# Patient Record
Sex: MALE | Race: WHITE | NOT HISPANIC OR LATINO | Employment: STUDENT | URBAN - METROPOLITAN AREA
[De-identification: names, ages, dates, MRNs, and addresses within clinical notes are randomized per-mention and may not be internally consistent; named-entity substitution may affect disease eponyms.]

---
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
## 2019-01-30 NOTE — PROCEDURES
ROUTINE ELECTROENCEPHALOGRAM REPORT      William Carter  [id number]  2000    DATE OF SERVICE: 11/12/18    REQUESTING PROVIDER: Glenroy Gasca MD    REASON FOR CONSULT: 19yo M with episodes concerning for seizure    METHODOLOGY   Electroencephalographic (EEG) recording is with electrodes placed according to the International 10-20 placement system.  Thirty two (32) channels of digital signal (sampling rate of 512/sec) including T1 and T2 was simultaneously recorded from the scalp and may include  EKG, EMG, and/or eye monitors.  Recording band pass was 0.1 to 512 hz.  Digital video recording of the patient is simultaneously recorded with the EEG.  The patient is instructed report clinical symptoms which may occur during the recording session.  EEG and video recording is stored and archived in digital format. Activation procedures which include photic stimulation, hyperventilation and instructing patients to perform simple task are done in selected patients.    The EEG is displayed on a monitor screen and can be reviewed using different montages.  Computer assisted analysis is employed to detect spike and electrographic seizure activity.   The entire record is submitted for computer analysis.  The entire recording is visually reviewed and the times identified by computer analysis as being spikes or seizures are reviewed again.  Compresses spectral analysis (CSA) is also performed on the activity recorded from each individual channel.  This is displayed as a power display of frequencies from 0 to 30 Hz over time.   The CSA is reviewed looking for asymmetries in power between homologous areas of the scalp and then compared with the original EEG recording.     nextsocial software was also utilized in the review of this study.  This software suite analyzes the EEG recording in multiple domains.  Coherence and rhythmicity is computed to identify EEG sections which may contain organized seizures.  Each channel undergoes  analysis to detect presence of spike and sharp waves which have special and morphological characteristic of epileptic activity.  The routine EEG recording is converted from spacial into frequency domain.  This is then displayed comparing homologous areas to identify areas of significant asymmetry.  Algorithm to identify non-cortically generated artifact is used to separate eye movement, EMG and other artifact from the EEG    EEG FINDINGS  Background activity:   The background rhythm was characterized by (9-10 Hz) activity with a 10 Hz posterior dominant alpha rhythm at 30-70 microvolts.   Symmetry and continuity: The background was continuous and symmetric    Sleep:  Normal sleep transients including sleep spindles, K complexes, vertex waves and POSTS were seen.    Activation procedures:   Hyperventilation was performed with no abnormalities seen - triggered an event, see below  Photic stimulation was performed with no abnormalities seen - triggered an event, see below    Abnormal activity:   No epileptiform discharges, periodic discharges, lateralized rhythmic delta activity or electrographic seizures were seen.    EVENT # 1:  Onset at 09:51:35 during photic stimulation: patient was noted to have back arching with head extended back and generalized arrhythmic shaking. He is able to respond verbally to the EEG tech appropriately and is noted to obey her instructions appropriately as well. The event ends at 09:52:21  EEG: there is associated electrode and EMG artifacts, but no epileptiform correlate is noted.    EVENT # 2:  Onset at 10:16:55 during hyperventilation: event is similar to event # 1 except that the patient has his right arm held up with wrist extended. He is able to interact appropriately with the EEG tech during the event that ends at 10:17:35  EEG: there is associated electrode and EMG artifacts, but no epileptiform correlate is noted.    IMPRESSION:   This is a normal routine EEG in awake, drowsy and  asleep states.  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    A normal EEG does not rule out seizures/epilepsy.  CLINICAL CORRELATION IS RECOMMENDED.    Yoselin Andrade MD, CHARLES(), ROSALIND HONEYCUTT.  Neurology-Epilepsy.  [facility].        KARLA/FERMÍN  dd: 01/29/2019 15:04:14 (CST)  td: 01/30/2019 04:42:51 (CST)  Doc ID   #[id number]  Job ID #[id number]    CC:

## 2019-11-30 ENCOUNTER — HOSPITAL ENCOUNTER (EMERGENCY)
Facility: HOSPITAL | Age: 19
Discharge: HOME | End: 2019-12-01
Attending: EMERGENCY MEDICINE
Payer: COMMERCIAL

## 2019-11-30 DIAGNOSIS — R56.9 SEIZURE-LIKE ACTIVITY: Primary | ICD-10-CM

## 2019-11-30 LAB
ALBUMIN SERPL BCP-MCNC: 4.3 G/DL (ref 3.5–5.2)
ALP SERPL-CCNC: 57 U/L (ref 55–135)
ALT SERPL W/O P-5'-P-CCNC: 17 U/L (ref 10–44)
AMPHET+METHAMPHET UR QL: NEGATIVE
ANION GAP SERPL CALC-SCNC: 8 MMOL/L (ref 8–16)
AST SERPL-CCNC: 19 U/L (ref 10–40)
BARBITURATES UR QL SCN>200 NG/ML: NEGATIVE
BASOPHILS # BLD AUTO: 0.04 K/UL (ref 0–0.2)
BASOPHILS NFR BLD: 0.5 % (ref 0–1.9)
BENZODIAZ UR QL SCN>200 NG/ML: NEGATIVE
BILIRUB SERPL-MCNC: 0.3 MG/DL (ref 0.1–1)
BILIRUB UR QL STRIP: NEGATIVE
BUN SERPL-MCNC: 14 MG/DL (ref 6–20)
BZE UR QL SCN: NEGATIVE
CALCIUM SERPL-MCNC: 9.4 MG/DL (ref 8.7–10.5)
CANNABINOIDS UR QL SCN: NEGATIVE
CHLORIDE SERPL-SCNC: 104 MMOL/L (ref 95–110)
CK SERPL-CCNC: 197 U/L (ref 20–200)
CLARITY UR REFRACT.AUTO: CLEAR
CO2 SERPL-SCNC: 28 MMOL/L (ref 23–29)
COLOR UR AUTO: YELLOW
CREAT SERPL-MCNC: 0.9 MG/DL (ref 0.5–1.4)
CREAT UR-MCNC: 88 MG/DL (ref 23–375)
DIFFERENTIAL METHOD: ABNORMAL
EOSINOPHIL # BLD AUTO: 0.1 K/UL (ref 0–0.5)
EOSINOPHIL NFR BLD: 1.4 % (ref 0–8)
ERYTHROCYTE [DISTWIDTH] IN BLOOD BY AUTOMATED COUNT: 11.9 % (ref 11.5–14.5)
EST. GFR  (AFRICAN AMERICAN): >60 ML/MIN/1.73 M^2
EST. GFR  (NON AFRICAN AMERICAN): >60 ML/MIN/1.73 M^2
GLUCOSE SERPL-MCNC: 110 MG/DL (ref 70–110)
GLUCOSE UR QL STRIP: NEGATIVE
HCT VFR BLD AUTO: 42.6 % (ref 40–54)
HGB BLD-MCNC: 14.2 G/DL (ref 14–18)
HGB UR QL STRIP: ABNORMAL
IMM GRANULOCYTES # BLD AUTO: 0.01 K/UL (ref 0–0.04)
IMM GRANULOCYTES NFR BLD AUTO: 0.1 % (ref 0–0.5)
KETONES UR QL STRIP: NEGATIVE
LEUKOCYTE ESTERASE UR QL STRIP: NEGATIVE
LYMPHOCYTES # BLD AUTO: 3.3 K/UL (ref 1–4.8)
LYMPHOCYTES NFR BLD: 41.9 % (ref 18–48)
MCH RBC QN AUTO: 31.3 PG (ref 27–31)
MCHC RBC AUTO-ENTMCNC: 33.3 G/DL (ref 32–36)
MCV RBC AUTO: 94 FL (ref 82–98)
METHADONE UR QL SCN>300 NG/ML: NEGATIVE
MICROSCOPIC COMMENT: NORMAL
MONOCYTES # BLD AUTO: 0.7 K/UL (ref 0.3–1)
MONOCYTES NFR BLD: 9.4 % (ref 4–15)
NEUTROPHILS # BLD AUTO: 3.7 K/UL (ref 1.8–7.7)
NEUTROPHILS NFR BLD: 46.7 % (ref 38–73)
NITRITE UR QL STRIP: NEGATIVE
NRBC BLD-RTO: 0 /100 WBC
OPIATES UR QL SCN: NEGATIVE
PCP UR QL SCN>25 NG/ML: NEGATIVE
PH UR STRIP: 6 [PH] (ref 5–8)
PLATELET # BLD AUTO: 239 K/UL (ref 150–350)
PMV BLD AUTO: 9.4 FL (ref 9.2–12.9)
POTASSIUM SERPL-SCNC: 3.5 MMOL/L (ref 3.5–5.1)
PROT SERPL-MCNC: 7 G/DL (ref 6–8.4)
PROT UR QL STRIP: NEGATIVE
RBC # BLD AUTO: 4.54 M/UL (ref 4.6–6.2)
RBC #/AREA URNS AUTO: 2 /HPF (ref 0–4)
SODIUM SERPL-SCNC: 140 MMOL/L (ref 136–145)
SP GR UR STRIP: 1.01 (ref 1–1.03)
TOXICOLOGY INFORMATION: NORMAL
TSH SERPL DL<=0.005 MIU/L-ACNC: 0.68 UIU/ML (ref 0.4–4)
URN SPEC COLLECT METH UR: ABNORMAL
WBC # BLD AUTO: 7.86 K/UL (ref 3.9–12.7)

## 2019-11-30 PROCEDURE — 80307 DRUG TEST PRSMV CHEM ANLYZR: CPT

## 2019-11-30 PROCEDURE — 99284 EMERGENCY DEPT VISIT MOD MDM: CPT | Mod: 25

## 2019-11-30 PROCEDURE — 96360 HYDRATION IV INFUSION INIT: CPT

## 2019-11-30 PROCEDURE — 99285 EMERGENCY DEPT VISIT HI MDM: CPT | Mod: ,,, | Performed by: PHYSICIAN ASSISTANT

## 2019-11-30 PROCEDURE — 85025 COMPLETE CBC W/AUTO DIFF WBC: CPT

## 2019-11-30 PROCEDURE — 82550 ASSAY OF CK (CPK): CPT

## 2019-11-30 PROCEDURE — 99285 PR EMERGENCY DEPT VISIT,LEVEL V: ICD-10-PCS | Mod: ,,, | Performed by: PHYSICIAN ASSISTANT

## 2019-11-30 PROCEDURE — 81001 URINALYSIS AUTO W/SCOPE: CPT

## 2019-11-30 PROCEDURE — 80053 COMPREHEN METABOLIC PANEL: CPT

## 2019-11-30 PROCEDURE — 63600175 PHARM REV CODE 636 W HCPCS: Performed by: PHYSICIAN ASSISTANT

## 2019-11-30 PROCEDURE — 84443 ASSAY THYROID STIM HORMONE: CPT

## 2019-11-30 RX ORDER — SERTRALINE HYDROCHLORIDE 100 MG/1
100 TABLET, FILM COATED ORAL DAILY
COMMUNITY
End: 2023-04-12

## 2019-11-30 RX ADMIN — SODIUM CHLORIDE 1000 ML: 0.9 INJECTION, SOLUTION INTRAVENOUS at 10:11

## 2019-12-01 VITALS
RESPIRATION RATE: 51 BRPM | BODY MASS INDEX: 25.77 KG/M2 | DIASTOLIC BLOOD PRESSURE: 64 MMHG | HEART RATE: 72 BPM | SYSTOLIC BLOOD PRESSURE: 114 MMHG | WEIGHT: 180 LBS | TEMPERATURE: 98 F | HEIGHT: 70 IN | OXYGEN SATURATION: 99 %

## 2019-12-01 NOTE — ED PROVIDER NOTES
Encounter Date: 11/30/2019       History     Chief Complaint   Patient presents with    Seizures     PT had multiple seizures with family foe 20 minutes, no seizure HX, DX with psuedo-stress seizures, taking lexapro     Patient is a 19 year old male with PMHX of depression. He presents to the ED via EMS for seizure like activity. Family reports patient with seizure like activity occurring innumerable times today. Denies tongue biting or urinary/fecal incontinence. Patient denies increased stress at home and or at training. He denies fever,chills, nausea, vomiting, sob, chest pain, abd pain, dysuria, diarrhea, or constipation. He is a non smoker and denies alcohol use. Denies recreational drug use. 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    The history is provided by the patient, a relative and medical records. No  was used.     Review of patient's allergies indicates:  No Known Allergies  Past Medical History:   Diagnosis Date    Concussion     may 2014    Depression     History of psychiatric hospitalization     Panic attacks      Past Surgical History:   Procedure Laterality Date    ESOPHAGOGASTRODUODENOSCOPY N/A 8/15/2018    Procedure: ESOPHAGOGASTRODUODENOSCOPY (EGD);  Surgeon: Fatmata Lam MD;  Location: [address]);  Service: Endoscopy;  Laterality: N/A;     Family History   Problem Relation Age of Onset    Hypertension Father     Diabetes Father     Depression Maternal Grandmother         situational    Diabetes Paternal Grandmother     Diabetes Paternal Grandfather     Bipolar disorder Mother     Drug abuse Mother      Social History     Tobacco Use    Smoking status: Never Smoker    Smokeless tobacco: Never Used    Tobacco comment: Dad smokes outside   Substance Use Topics    Alcohol use: No    Drug use: No     Review of Systems   Constitutional: Negative for fever.   HENT: Negative for sore throat.    Respiratory: Negative for shortness of breath.     Cardiovascular: Negative for chest pain.   Gastrointestinal: Negative for abdominal pain, nausea and vomiting.   Genitourinary: Negative for dysuria.   Musculoskeletal: Negative for back pain.   Skin: Negative for rash.   Neurological: Positive for seizures. Negative for weakness.   Hematological: Does not bruise/bleed easily.       Physical Exam     Initial Vitals [11/30/19 2206]   BP Pulse Resp Temp SpO2   (!) 144/62 88 18 98.1 °F (36.7 °C) 98 %      MAP       --         Physical Exam    Vitals reviewed.  Constitutional: He appears well-developed and well-nourished. No distress.   HENT:   Head: Normocephalic.   Eyes: Conjunctivae and EOM are normal. Pupils are equal, round, and reactive to light.   Neck: Normal range of motion.   Cardiovascular: Normal rate and regular rhythm.   No murmur heard.  Pulmonary/Chest: Breath sounds normal. No respiratory distress. He has no wheezes. He has no rales.   Abdominal: Soft. Bowel sounds are normal. He exhibits no distension. There is no tenderness.   Musculoskeletal: Normal range of motion.   Neurological: He is alert and oriented to person, place, and time. He has normal strength. No sensory deficit. Coordination normal.   Motor strength of b/l UE and LE 5/5. Finger to nose negative. Heel to shin negative. Pronator drift negative. No facial droop or asymmetry. Speech is clear. Tongue protrudes midline with no fasciculations.   Skin: Skin is warm and dry. No erythema.         ED Course   Procedures  Labs Reviewed   CBC W/ AUTO DIFFERENTIAL - Abnormal; Notable for the following components:       Result Value    RBC 4.54 (*)     Mean Corpuscular Hemoglobin 31.3 (*)     All other components within normal limits   URINALYSIS, REFLEX TO URINE CULTURE - Abnormal; Notable for the following components:    Occult Blood UA 1+ (*)     All other components within normal limits    Narrative:     Preferred Collection Type->Urine, Clean Catch   COMPREHENSIVE METABOLIC PANEL   TSH   CK    DRUG SCREEN PANEL, URINE EMERGENCY    Narrative:     Preferred Collection Type->Urine, Clean Catch   URINALYSIS MICROSCOPIC    Narrative:     Preferred Collection Type->Urine, Clean Catch             Medical Decision Making:   History:   Old Medical Records: I decided to obtain old medical records.  Old Records Summarized: records from clinic visits.       <> Summary of Records: According to records, patient underwent CT head on 09/21/18 found to have no evidence for acute intracranial hemorrhage or new abnormal parenchymal attenuation. Patient underwent EEG monitoring on 11/12/18 found to have normal routine EEG in awake, drowsy and asleep states.  Clinical Tests:   Lab Tests: Ordered and Reviewed       APC / Resident Notes:   Patient is a 19 year old male presents to the ED for emergent evaluation of seizure like activity.     Will order labs. Will continue to monitor.     Differential diagnoses include, but are not limited to: seizure, conversion disorder, anxiety disorder, or electrolyte imbalance.     No leukocytosis. Hemodynamically stable. UA unremarkable for infectious process. UDS unremarkable.     Patient reassessed, reports symptoms improved with ED management. I have discussed emergency department findings, and plan with the patient. Will discharge home with F/U with neurology, psychiatry, and PCP. Patient verbalizes understanding of plan and agrees. Return precautions given.     I have discussed and reviewed with my supervising physician.          Clinical Impression:       ICD-10-CM ICD-9-CM   1. Seizure-like activity R56.9 780.39         Disposition:   Disposition: Discharged  Condition: Stable                     Sujata Palomo PA-C  12/01/19 0718

## 2019-12-01 NOTE — ED TRIAGE NOTES
Per ems, pt family states that he had multiple seizures before ems arrival. Ems states that pt did not have seizure during transport. Pt has Hx of pseudoseizures per ems, no meds given enroute. Pt A&O x4 responding appropriately no signs of seizure activity, pt states that he does not remember the event. Pt denies pain denies injuries.          Patient identifiers verified and correct for William Carter    LOC: The patient is awake, alert and aware of environment with an appropriate affect, the patient is oriented x 3 and speaking appropriately.   APPEARANCE: Patient appears comfortable and in no acute distress, patient is clean and well groomed.  SKIN: The skin is warm and dry, color consistent with ethnicity, patient has normal skin turgor and moist mucus membranes, skin intact, no breakdown or bruising noted.   MUSCULOSKELETAL: Patient moving all extremities spontaneously, no swelling noted.  RESPIRATORY: Airway is open and patent, respirations are spontaneous, patient has a normal effort and rate, no accessory muscle use noted, pt placed on continuous pulse ox with O2 sats noted at 97% on room air.  CARDIAC: Pt placed on cardiac monitor. Patient has a normal rate and regular rhythm, no edema noted, capillary refill < 3 seconds.   GASTRO: Soft and non tender to palpation, no distention noted, normoactive bowel sounds present in all four quadrants. Pt states bowel movements have been regular.  : Pt denies any pain or frequency with urination.  NEURO: Pt opens eyes spontaneously, behavior appropriate to situation, follows commands, facial expression symmetrical, bilateral hand grasp equal and even, purposeful motor response noted, normal sensation in all extremities when touched with a finger.

## 2020-10-05 ENCOUNTER — PATIENT MESSAGE (OUTPATIENT)
Dept: ADMINISTRATIVE | Facility: HOSPITAL | Age: 20
End: 2020-10-05

## 2021-01-04 ENCOUNTER — PATIENT MESSAGE (OUTPATIENT)
Dept: ADMINISTRATIVE | Facility: HOSPITAL | Age: 21
End: 2021-01-04

## 2021-04-05 ENCOUNTER — PATIENT MESSAGE (OUTPATIENT)
Dept: ADMINISTRATIVE | Facility: HOSPITAL | Age: 21
End: 2021-04-05

## 2021-04-06 ENCOUNTER — OFFICE VISIT (OUTPATIENT)
Dept: URGENT CARE | Facility: CLINIC | Age: 21
End: 2021-04-06
Payer: MEDICAID

## 2021-04-06 VITALS
HEART RATE: 85 BPM | TEMPERATURE: 99 F | DIASTOLIC BLOOD PRESSURE: 87 MMHG | BODY MASS INDEX: 25.77 KG/M2 | WEIGHT: 180 LBS | OXYGEN SATURATION: 98 % | RESPIRATION RATE: 16 BRPM | SYSTOLIC BLOOD PRESSURE: 121 MMHG | HEIGHT: 70 IN

## 2021-04-06 DIAGNOSIS — R05.9 COUGH: ICD-10-CM

## 2021-04-06 DIAGNOSIS — J01.00 ACUTE MAXILLARY SINUSITIS, RECURRENCE NOT SPECIFIED: Primary | ICD-10-CM

## 2021-04-06 LAB
CTP QC/QA: YES
SARS-COV-2 RDRP RESP QL NAA+PROBE: NEGATIVE

## 2021-04-06 PROCEDURE — 99214 OFFICE O/P EST MOD 30 MIN: CPT | Mod: S$GLB,,, | Performed by: PHYSICIAN ASSISTANT

## 2021-04-06 PROCEDURE — U0002 COVID-19 LAB TEST NON-CDC: HCPCS | Mod: QW,S$GLB,, | Performed by: PHYSICIAN ASSISTANT

## 2021-04-06 PROCEDURE — U0002: ICD-10-PCS | Mod: QW,S$GLB,, | Performed by: PHYSICIAN ASSISTANT

## 2021-04-06 PROCEDURE — 99214 PR OFFICE/OUTPT VISIT, EST, LEVL IV, 30-39 MIN: ICD-10-PCS | Mod: S$GLB,,, | Performed by: PHYSICIAN ASSISTANT

## 2021-04-06 RX ORDER — FLUTICASONE PROPIONATE 50 MCG
1 SPRAY, SUSPENSION (ML) NASAL DAILY
Qty: 11.1 ML | Refills: 0 | Status: SHIPPED | OUTPATIENT
Start: 2021-04-06 | End: 2022-01-25

## 2021-04-06 RX ORDER — BENZONATATE 200 MG/1
200 CAPSULE ORAL 3 TIMES DAILY PRN
Qty: 30 CAPSULE | Refills: 0 | Status: SHIPPED | OUTPATIENT
Start: 2021-04-06 | End: 2021-04-16

## 2021-07-06 ENCOUNTER — PATIENT MESSAGE (OUTPATIENT)
Dept: ADMINISTRATIVE | Facility: HOSPITAL | Age: 21
End: 2021-07-06

## 2021-08-07 ENCOUNTER — CLINICAL SUPPORT (OUTPATIENT)
Dept: URGENT CARE | Facility: CLINIC | Age: 21
End: 2021-08-07
Payer: MEDICAID

## 2021-08-07 VITALS — TEMPERATURE: 99 F

## 2021-08-07 DIAGNOSIS — Z11.52 ENCOUNTER FOR SCREENING LABORATORY TESTING FOR COVID-19 VIRUS: Primary | ICD-10-CM

## 2021-08-07 LAB
CTP QC/QA: YES
SARS-COV-2 RDRP RESP QL NAA+PROBE: NEGATIVE

## 2021-08-07 PROCEDURE — 87635 SARS-COV-2 COVID-19 AMP PRB: CPT | Mod: QW,S$GLB,, | Performed by: NURSE PRACTITIONER

## 2021-08-07 PROCEDURE — 87635: ICD-10-PCS | Mod: QW,S$GLB,, | Performed by: NURSE PRACTITIONER

## 2023-04-12 ENCOUNTER — OFFICE VISIT (OUTPATIENT)
Dept: INTERNAL MEDICINE | Facility: CLINIC | Age: 23
End: 2023-04-12
Payer: COMMERCIAL

## 2023-04-12 ENCOUNTER — LAB VISIT (OUTPATIENT)
Dept: LAB | Facility: HOSPITAL | Age: 23
End: 2023-04-12
Attending: STUDENT IN AN ORGANIZED HEALTH CARE EDUCATION/TRAINING PROGRAM
Payer: COMMERCIAL

## 2023-04-12 VITALS
WEIGHT: 231.69 LBS | BODY MASS INDEX: 32.44 KG/M2 | DIASTOLIC BLOOD PRESSURE: 80 MMHG | TEMPERATURE: 98 F | HEIGHT: 71 IN | HEART RATE: 88 BPM | SYSTOLIC BLOOD PRESSURE: 120 MMHG | OXYGEN SATURATION: 99 %

## 2023-04-12 DIAGNOSIS — R73.03 PREDIABETES: ICD-10-CM

## 2023-04-12 DIAGNOSIS — Z72.0 VAPES NICOTINE CONTAINING SUBSTANCE: ICD-10-CM

## 2023-04-12 DIAGNOSIS — F41.9 ANXIETY: ICD-10-CM

## 2023-04-12 DIAGNOSIS — Z00.00 ANNUAL PHYSICAL EXAM: Primary | ICD-10-CM

## 2023-04-12 DIAGNOSIS — F32.A DEPRESSION, UNSPECIFIED DEPRESSION TYPE: ICD-10-CM

## 2023-04-12 DIAGNOSIS — E66.09 CLASS 1 OBESITY DUE TO EXCESS CALORIES WITHOUT SERIOUS COMORBIDITY WITH BODY MASS INDEX (BMI) OF 32.0 TO 32.9 IN ADULT: ICD-10-CM

## 2023-04-12 PROCEDURE — 36415 COLL VENOUS BLD VENIPUNCTURE: CPT | Performed by: STUDENT IN AN ORGANIZED HEALTH CARE EDUCATION/TRAINING PROGRAM

## 2023-04-12 PROCEDURE — 99999 PR PBB SHADOW E&M-EST. PATIENT-LVL IV: CPT | Mod: PBBFAC,,, | Performed by: STUDENT IN AN ORGANIZED HEALTH CARE EDUCATION/TRAINING PROGRAM

## 2023-04-12 PROCEDURE — 99395 PR PREVENTIVE VISIT,EST,18-39: ICD-10-PCS | Mod: S$GLB,,, | Performed by: STUDENT IN AN ORGANIZED HEALTH CARE EDUCATION/TRAINING PROGRAM

## 2023-04-12 PROCEDURE — 1160F RVW MEDS BY RX/DR IN RCRD: CPT | Mod: CPTII,S$GLB,, | Performed by: STUDENT IN AN ORGANIZED HEALTH CARE EDUCATION/TRAINING PROGRAM

## 2023-04-12 PROCEDURE — 3008F PR BODY MASS INDEX (BMI) DOCUMENTED: ICD-10-PCS | Mod: CPTII,S$GLB,, | Performed by: STUDENT IN AN ORGANIZED HEALTH CARE EDUCATION/TRAINING PROGRAM

## 2023-04-12 PROCEDURE — 1159F MED LIST DOCD IN RCRD: CPT | Mod: CPTII,S$GLB,, | Performed by: STUDENT IN AN ORGANIZED HEALTH CARE EDUCATION/TRAINING PROGRAM

## 2023-04-12 PROCEDURE — 99999 PR PBB SHADOW E&M-EST. PATIENT-LVL IV: ICD-10-PCS | Mod: PBBFAC,,, | Performed by: STUDENT IN AN ORGANIZED HEALTH CARE EDUCATION/TRAINING PROGRAM

## 2023-04-12 PROCEDURE — 3074F PR MOST RECENT SYSTOLIC BLOOD PRESSURE < 130 MM HG: ICD-10-PCS | Mod: CPTII,S$GLB,, | Performed by: STUDENT IN AN ORGANIZED HEALTH CARE EDUCATION/TRAINING PROGRAM

## 2023-04-12 PROCEDURE — 3008F BODY MASS INDEX DOCD: CPT | Mod: CPTII,S$GLB,, | Performed by: STUDENT IN AN ORGANIZED HEALTH CARE EDUCATION/TRAINING PROGRAM

## 2023-04-12 PROCEDURE — 3079F PR MOST RECENT DIASTOLIC BLOOD PRESSURE 80-89 MM HG: ICD-10-PCS | Mod: CPTII,S$GLB,, | Performed by: STUDENT IN AN ORGANIZED HEALTH CARE EDUCATION/TRAINING PROGRAM

## 2023-04-12 PROCEDURE — 83036 HEMOGLOBIN GLYCOSYLATED A1C: CPT | Performed by: STUDENT IN AN ORGANIZED HEALTH CARE EDUCATION/TRAINING PROGRAM

## 2023-04-12 PROCEDURE — 3079F DIAST BP 80-89 MM HG: CPT | Mod: CPTII,S$GLB,, | Performed by: STUDENT IN AN ORGANIZED HEALTH CARE EDUCATION/TRAINING PROGRAM

## 2023-04-12 PROCEDURE — 1159F PR MEDICATION LIST DOCUMENTED IN MEDICAL RECORD: ICD-10-PCS | Mod: CPTII,S$GLB,, | Performed by: STUDENT IN AN ORGANIZED HEALTH CARE EDUCATION/TRAINING PROGRAM

## 2023-04-12 PROCEDURE — 99395 PREV VISIT EST AGE 18-39: CPT | Mod: S$GLB,,, | Performed by: STUDENT IN AN ORGANIZED HEALTH CARE EDUCATION/TRAINING PROGRAM

## 2023-04-12 PROCEDURE — 3074F SYST BP LT 130 MM HG: CPT | Mod: CPTII,S$GLB,, | Performed by: STUDENT IN AN ORGANIZED HEALTH CARE EDUCATION/TRAINING PROGRAM

## 2023-04-12 PROCEDURE — 1160F PR REVIEW ALL MEDS BY PRESCRIBER/CLIN PHARMACIST DOCUMENTED: ICD-10-PCS | Mod: CPTII,S$GLB,, | Performed by: STUDENT IN AN ORGANIZED HEALTH CARE EDUCATION/TRAINING PROGRAM

## 2023-04-12 NOTE — PROGRESS NOTES
SUBJECTIVE     Chief Complaint   Patient presents with    Annual Exam       HPI  William Carter is a 22 y.o. male with  Anxiety with panic attacks, depression  that presents for annual exam.     PCP is Beth Oates MD    Did health screening at work, reports that blood work was done and was told he has prediabetes. Not sure what the number. Family history of diabetes. Weight today is 231, up from 210 from 1/2023. Reports that he has tried to eat less recently. Avoiding sugar. Mostly eating chicken, some fried chicken due to work and moving. Reports being physical at work, but not outside of work. Planning to walk again, going to gym.     Tobacco: Daily nicotine vape. Never smoked cigarettes.   EtOH: None.   Recreational Drugs: None.   Works at [employer] for [email or web address].   Sexually active with one male partner. Does not engage in penetrative sex. No concerns for STIs.       PAST MEDICAL HISTORY:  Past Medical History:   Diagnosis Date    Concussion     may 2014    Depression     History of psychiatric hospitalization     Panic attacks        PAST SURGICAL HISTORY:  Past Surgical History:   Procedure Laterality Date    ESOPHAGOGASTRODUODENOSCOPY N/A 8/15/2018    Procedure: ESOPHAGOGASTRODUODENOSCOPY (EGD);  Surgeon: Fatmata Lam MD;  Location: [address]);  Service: Endoscopy;  Laterality: N/A;       FAMILY HISTORY:  Family History   Problem Relation Age of Onset    Hypertension Father     Diabetes Father     Depression Maternal Grandmother         situational    Diabetes Paternal Grandmother     Diabetes Paternal Grandfather     Bipolar disorder Mother     Drug abuse Mother        ALLERGIES AND MEDICATIONS: updated and reviewed.  Review of patient's allergies indicates:  No Known Allergies  No current outpatient medications on file.     No current facility-administered medications for this visit.       ROS  Review of Systems   Constitutional:  Negative for activity  "change, chills and fever.   HENT:  Negative for congestion and hearing loss.    Eyes:  Negative for pain and visual disturbance.   Respiratory:  Negative for cough and shortness of breath.    Cardiovascular:  Negative for chest pain and palpitations.   Gastrointestinal:  Negative for abdominal pain, constipation, diarrhea, nausea and vomiting.   Endocrine: Negative.    Genitourinary: Negative.    Musculoskeletal:  Negative for arthralgias and myalgias.   Skin: Negative.    Allergic/Immunologic: Negative.    Neurological:  Negative for dizziness, light-headedness and headaches.   Hematological: Negative.        OBJECTIVE     Physical Exam  Vitals:    04/12/23 1455   BP: 120/80   Pulse: 88   Temp: 98.2 °F (36.8 °C)    Body mass index is 32.32 kg/m².  Weight: 105.1 kg (231 lb 11.3 oz)   Height: 5' 11" (180.3 cm)     Physical Exam  Vitals reviewed.   Constitutional:       General: He is not in acute distress.     Appearance: Normal appearance. He is obese.   HENT:      Head: Normocephalic and atraumatic.      Mouth/Throat:      Mouth: Mucous membranes are moist.      Pharynx: Oropharynx is clear.   Eyes:      Extraocular Movements: Extraocular movements intact.      Conjunctiva/sclera: Conjunctivae normal.      Pupils: Pupils are equal, round, and reactive to light.   Cardiovascular:      Rate and Rhythm: Normal rate and regular rhythm.      Pulses: Normal pulses.      Heart sounds: Normal heart sounds.   Pulmonary:      Effort: Pulmonary effort is normal.      Breath sounds: Normal breath sounds.   Abdominal:      General: Bowel sounds are normal. There is no distension.      Palpations: Abdomen is soft. There is no mass.      Tenderness: There is no abdominal tenderness. There is no guarding.   Musculoskeletal:         General: Normal range of motion.      Cervical back: Normal range of motion and neck supple. No rigidity or tenderness.      Right lower leg: No edema.      Left lower leg: No edema.   Lymphadenopathy: "      Cervical: No cervical adenopathy.   Skin:     General: Skin is warm and dry.   Neurological:      General: No focal deficit present.      Mental Status: He is alert.   Psychiatric:         Mood and Affect: Mood normal.         Behavior: Behavior normal.         Health Maintenance         Date Due Completion Date    Hepatitis C Screening Never done ---    HIV Screening Never done ---    TETANUS VACCINE 09/08/2020 9/8/2010    COVID-19 Vaccine (3 - Booster for Pfizer series) 07/30/2021 6/4/2021    Influenza Vaccine (1) 09/01/2022 10/11/2019              ASSESSMENT     22 y.o. male with     1. Annual physical exam    2. Vapes nicotine containing substance    3. Prediabetes    4. Anxiety    5. Depression, unspecified depression type        PLAN:     1. Annual physical exam  - Discussed age and gender appropriate screenings at this visit and encouraged a healthy diet low in simple carbohydrates, and increased physical activity.  Counseled on medically appropriate vaccines based on age and current health condition.  Screening test reviewed and discussed with patient.   - Declines vaccines today.     2. Vapes nicotine containing substance  - Counseled on risks of vaping, reviewed nicotine replacement therapy. Patient declines at this moment. Will opt to wean self off of vape.     3. Prediabetes  - Reported diagnosis on recent work screen.   - Counseled at length about diet/physical activity changes.   - HEMOGLOBIN A1C; Future    4. Anxiety  5. Depression  - Controlled off of medication. Continue monitoring symptoms.     6. Class 1 obesity due to excess calories without serious comorbidity with body mass index (BMI) of 32.0 to 32.9 in adult.  - Counseled on Mediterranean diet and physical activity above baseline. Given printed information about this.     RTC in 1 year, earlier PRN.      Jordi Piper MD  Family Medicine  [facility]  04/12/2023    This document was created using voice  recognition software (M*Modal Fluency Direct). Although it may be edited, this document may contain errors related to incorrect recognition of the spoken word. Please call the physician if clarification is needed.           No follow-ups on file.

## 2023-04-12 NOTE — PATIENT INSTRUCTIONS
Mediterranean style diet:    Eat:  Olive oil, lean meats such as chicken and fish and only small servings of carbohydrates.   Olive oil and vinegar instead of low fat salad dressings.  Cook food in olive oil.  You can pan lao or saute fish and vegetables instead of boiling or baking.  Unsalted nuts for snacks. Walnuts, cashews, almonds, pecans and pistachios ( not peanuts). Try almond butter or cashew butter on toast or crackers.  Brown bread. You can also dip bread in olive oil and eat it as a snack or appetizer  Seasonal or frozen vegetables and fruits.     Avoid:  Saturated fats and deep fried foods. Also stay away from large servings of starches, sweets, desserts and sugary drinks (both sodas and fruit juices)    Physical Activity Recommendations  Aerobic (or cardio) activity gets your heart rate up and benefits your heart by improving cardiorespiratory fitness. When done at moderate intensity, your heart will beat faster and youll breathe harder than normal, but youll still be able to talk. Think of it as a medium or moderate amount of effort.    It is recommended that you do 30 minutes of moderate-intensity aerobic exercise at least 5 days a week.     Examples of moderate-intensity aerobic activities:    - brisk walking (at least 2.5 miles per hour)  - water aerobics  - dancing (ballroom or social)  - gardening  - tennis (doubles)  - biking slower than 10 miles per hour

## 2023-04-13 LAB
ESTIMATED AVG GLUCOSE: 120 MG/DL (ref 68–131)
HBA1C MFR BLD: 5.8 % (ref 4–5.6)

## 2023-11-14 NOTE — ANESTHESIA PREPROCEDURE EVALUATION
08/15/2018  William Carter is a 18 y.o., male.    Anesthesia Evaluation    I have reviewed the Patient Summary Reports.     I have reviewed the Medications.     Review of Systems  Hematology/Oncology:  Hematology Normal   Oncology Normal     EENT/Dental:EENT/Dental Normal   Cardiovascular:  Cardiovascular Normal     Pulmonary:  Pulmonary Normal    Renal/:  Renal/ Normal     Hepatic/GI:  Hepatic/GI Normal    Musculoskeletal:  Musculoskeletal Normal    Neurological:   Headaches    Endocrine:  Endocrine Normal    Dermatological:  Skin Normal    Psych:   Psychiatric History          Physical Exam  General:  Well nourished    Airway/Jaw/Neck:  Airway Findings: Mouth Opening: Normal Tongue: Normal  General Airway Assessment: Adult  Mallampati: I  Improves to I with phonation.  TM Distance: Normal, at least 6 cm        Eyes/Ears/Nose:  EYES/EARS/NOSE FINDINGS: Normal   Dental:  DENTAL FINDINGS: Normal   Chest/Lungs:  Chest/Lungs Clear    Heart/Vascular:  Heart Findings: Normal Heart murmur: negative Vascular Findings: Normal    Abdomen:  Abdomen Findings: Normal    Musculoskeletal:  Musculoskeletal Findings: Normal   Skin:  Skin Findings: Normal    Mental Status:  Mental Status Findings: Normal        Anesthesia Plan  Type of Anesthesia, risks & benefits discussed:  Anesthesia Type:  general  Patient's Preference: General  Intra-op Monitoring Plan: standard ASA monitors  Intra-op Monitoring Plan Comments:   Post Op Pain Control Plan:   Post Op Pain Control Plan Comments:   Induction:   IV  Beta Blocker:  Patient is not currently on a Beta-Blocker (No further documentation required).       Informed Consent: Patient understands risks and agrees with Anesthesia plan.  Questions answered. Anesthesia consent signed with patient.  ASA Score: 2     Day of Surgery Review of History & Physical:    H&P update referred to  the surgeon.         Ready For Surgery From Anesthesia Perspective.        up prep letter?: n/a

## 2024-01-10 ENCOUNTER — OFFICE VISIT (OUTPATIENT)
Dept: URGENT CARE | Facility: CLINIC | Age: 24
End: 2024-01-10
Payer: COMMERCIAL

## 2024-01-10 VITALS
HEIGHT: 70 IN | HEART RATE: 115 BPM | SYSTOLIC BLOOD PRESSURE: 120 MMHG | BODY MASS INDEX: 31.5 KG/M2 | WEIGHT: 220 LBS | RESPIRATION RATE: 18 BRPM | OXYGEN SATURATION: 97 % | TEMPERATURE: 98 F | DIASTOLIC BLOOD PRESSURE: 86 MMHG

## 2024-01-10 DIAGNOSIS — R50.9 FEVER, UNSPECIFIED FEVER CAUSE: ICD-10-CM

## 2024-01-10 DIAGNOSIS — B34.9 ACUTE VIRAL SYNDROME: Primary | ICD-10-CM

## 2024-01-10 DIAGNOSIS — R68.89 FLU-LIKE SYMPTOMS: ICD-10-CM

## 2024-01-10 LAB
CTP QC/QA: YES
CTP QC/QA: YES
POC MOLECULAR INFLUENZA A AGN: NEGATIVE
POC MOLECULAR INFLUENZA B AGN: NEGATIVE
SARS-COV-2 AG RESP QL IA.RAPID: NEGATIVE

## 2024-01-10 PROCEDURE — 87811 SARS-COV-2 COVID19 W/OPTIC: CPT | Mod: QW,S$GLB,, | Performed by: PHYSICIAN ASSISTANT

## 2024-01-10 PROCEDURE — 87502 INFLUENZA DNA AMP PROBE: CPT | Mod: QW,S$GLB,, | Performed by: PHYSICIAN ASSISTANT

## 2024-01-10 PROCEDURE — 99213 OFFICE O/P EST LOW 20 MIN: CPT | Mod: S$GLB,,, | Performed by: PHYSICIAN ASSISTANT

## 2024-01-10 RX ORDER — BROMPHENIRAMINE MALEATE, PSEUDOEPHEDRINE HYDROCHLORIDE, AND DEXTROMETHORPHAN HYDROBROMIDE 2; 30; 10 MG/5ML; MG/5ML; MG/5ML
10 SYRUP ORAL EVERY 6 HOURS PRN
Qty: 118 ML | Refills: 0 | Status: SHIPPED | OUTPATIENT
Start: 2024-01-10 | End: 2024-01-20

## 2024-01-10 NOTE — LETTER
January 10, 2024      [facility]  [address] [address], [address]  [place] [address]  Phone: [phone]  Fax: [phone]       Patient: William Carter   YOB: 2000  Date of Visit: 01/10/2024    To Whom It May Concern:    April Carter  was at [facility] on 01/10/2024. He may return to work/school on 1/12/2024 with no restrictions. If you have any questions or concerns, or if I can be of further assistance, please do not hesitate to contact me.    Sincerely,    Yuliet Olson PA-C

## 2024-01-11 NOTE — PROGRESS NOTES
"Subjective:      Patient ID: William Carter is a 23 y.o. male.    Vitals:  height is 5' 10" (1.778 m) and weight is 99.8 kg (220 lb). His oral temperature is 97.7 °F (36.5 °C). His blood pressure is 120/86 and his pulse is 115 (abnormal). His respiration is 18 and oxygen saturation is 97%.     Chief Complaint: Fever    Pt states that for about 2-3 days, he has been running a fever, diarrhea, nausea, and body aches. Stated that he has  what he thinks is a sinus drip with no appetite.     Patient provider note starts here:  Patient presents with complaints of flu like symptoms for the past 3 days. Reports nasal congestion, cough, body aches, nausea, diarrhea. He has been taking Dayquil/Nyquil, Motrin and Zofran for the symptoms. Denies chest pain or SOB. Reports one coworker has COVID currently but was not feeling ill when in contact with him.     Fever   This is a new problem. The current episode started in the past 7 days (2-3 days). The problem occurs constantly. The problem has been unchanged. His temperature was unmeasured prior to arrival. Associated symptoms include congestion, diarrhea, headaches, muscle aches and nausea. Pertinent negatives include no abdominal pain, chest pain, coughing, rash, sore throat, vomiting or wheezing. Associated symptoms comments: chills. Treatments tried: ibuprofen, otc cold and flu medication. The treatment provided no relief.       Constitution: Positive for sweating, fatigue and fever. Negative for chills.   HENT:  Positive for congestion and postnasal drip. Negative for sore throat.    Neck: Negative for neck pain and neck stiffness.   Cardiovascular:  Negative for chest pain.   Respiratory:  Negative for chest tightness, cough and wheezing.    Gastrointestinal:  Positive for nausea and diarrhea. Negative for abdominal pain and vomiting.   Musculoskeletal:  Positive for muscle ache. Negative for pain.   Skin:  Negative for rash and wound.   Allergic/Immunologic: Negative for " itching.   Neurological:  Positive for headaches. Negative for numbness and tingling.      Objective:     Physical Exam   Constitutional: He is oriented to person, place, and time. He appears well-developed. He is cooperative.  Non-toxic appearance. He does not appear ill. No distress.   HENT:   Head: Normocephalic and atraumatic.   Ears:   Right Ear: Hearing, tympanic membrane, external ear and ear canal normal.   Left Ear: Hearing, tympanic membrane, external ear and ear canal normal.   Nose: Congestion present. No mucosal edema, rhinorrhea or nasal deformity. No epistaxis. Right sinus exhibits no maxillary sinus tenderness and no frontal sinus tenderness. Left sinus exhibits no maxillary sinus tenderness and no frontal sinus tenderness.   Mouth/Throat: Uvula is midline and mucous membranes are normal. No trismus in the jaw. Normal dentition. No uvula swelling. Posterior oropharyngeal erythema present. No oropharyngeal exudate or posterior oropharyngeal edema.   Eyes: Conjunctivae and lids are normal. No scleral icterus.   Neck: Trachea normal and phonation normal. Neck supple. No edema present. No erythema present. No neck rigidity present.   Cardiovascular: Normal rate, regular rhythm, normal heart sounds and normal pulses.   Pulmonary/Chest: Effort normal and breath sounds normal. No respiratory distress. He has no decreased breath sounds. He has no wheezes. He has no rhonchi.   Abdominal: Normal appearance.   Musculoskeletal: Normal range of motion.         General: No deformity. Normal range of motion.   Neurological: He is alert and oriented to person, place, and time. He exhibits normal muscle tone. Coordination normal.   Skin: Skin is warm, dry, intact, not diaphoretic and not pale.   Psychiatric: His speech is normal and behavior is normal. Judgment and thought content normal.   Nursing note and vitals reviewed.      Assessment:     1. Acute viral syndrome    2. Fever, unspecified fever cause    3.  Flu-like symptoms      Results for orders placed or performed in visit on 01/10/24   POCT Influenza A/B Molecular   Result Value Ref Range    POC Molecular Influenza A Ag Negative Negative, Not Reported    POC Molecular Influenza B Ag Negative Negative, Not Reported     Acceptable Yes    SARS Coronavirus 2 Antigen, POCT Manual Read   Result Value Ref Range    SARS Coronavirus 2 Antigen Negative Negative     Acceptable Yes        Plan:       Acute viral syndrome  -     brompheniramine-pseudoeph-DM (BROMFED DM) 2-30-10 mg/5 mL Syrp; Take 10 mLs by mouth every 6 (six) hours as needed (Cough and congestion).  Dispense: 118 mL; Refill: 0    Fever, unspecified fever cause  -     POCT Influenza A/B Molecular  -     SARS Coronavirus 2 Antigen, POCT Manual Read    Flu-like symptoms          Medical Decision Making:   History:   Old Medical Records: I decided to obtain old medical records.  Clinical Tests:   Lab Tests: Ordered and Reviewed  Urgent Care Management:  A. Problem List:   -Acute: Flu like symptoms, viral syndrome   -Chronic: Anxiety and depression  B. Differential diagnosis: viral vs bacterial URI, pharyngitis, otitis, COVID 19, influenza, pneumonia  C. Diagnostic Testing Ordered: COVID, Flu   D. Diagnostic Testing Considered: None  E. Independent Historians: None  F. Urgent Care Midlevel Independent Results Interpretation: Flu negative, COVID negative  G. Radiology:  H. Review of Previous Medical Records:  I. Home Medications Reviewed  J. Social Determinants of Health considered  K. Medical Decision Making and Disposition: Patient presents with complaints of flu like symptoms for the past 3 days. On exam, he is afebrile ans nontoxic appearing. Lungs CTAB, vitals stable. COVID and Flu both negative. Likely other viral etiology. He was prescribed symptomatic treatment and encouraged close follow-up with PCP. ED precautions discussed, he verbalized understanding and agreed with plan.           Patient Instructions   If not allergic,take tylenol (acetominophen) for fever control, chills, or body aches every 4 hours. Do not exceed 4000 mg/ day.If not allergic, take Motrin (Ibuprofen) every 4 hours for fever, chills, pain or inflammation. Do not exceed 2400 mg/day. You can alternate taking tylenol and motrin.     You must understand that you've received an Urgent Care treatment only and that you may be released before all your medical problems are known or treated. You, the patient, will arrange for follow up care as instructed.      Follow up with your PCP or specialty clinic as instructed in the next 2-3 days if not improved or as needed. You can call [phone] to schedule an appointment with appropriate provider.      If you condition worsens, we recommend that you receive another evaluation at the emergency room immediately or contact your primary medical clinic's after hours call service to discuss your concerns.      Please return here or go to the Emergency Department for any concerns or worsening condition.      If you were prescribed a narcotic or controlled substance, do not drive or operate heavy equipment or machinery while taking these medications.

## 2024-02-07 ENCOUNTER — HOSPITAL ENCOUNTER (EMERGENCY)
Facility: HOSPITAL | Age: 24
Discharge: HOME | End: 2024-02-07
Attending: STUDENT IN AN ORGANIZED HEALTH CARE EDUCATION/TRAINING PROGRAM
Payer: COMMERCIAL

## 2024-02-07 VITALS
HEART RATE: 84 BPM | BODY MASS INDEX: 31.64 KG/M2 | OXYGEN SATURATION: 99 % | TEMPERATURE: 99 F | RESPIRATION RATE: 18 BRPM | WEIGHT: 221 LBS | DIASTOLIC BLOOD PRESSURE: 91 MMHG | SYSTOLIC BLOOD PRESSURE: 131 MMHG | HEIGHT: 70 IN

## 2024-02-07 DIAGNOSIS — I10 HYPERTENSION, UNSPECIFIED TYPE: ICD-10-CM

## 2024-02-07 DIAGNOSIS — R07.9 ACUTE CHEST PAIN: Primary | ICD-10-CM

## 2024-02-07 DIAGNOSIS — M94.0 ACUTE COSTOCHONDRITIS: ICD-10-CM

## 2024-02-07 LAB
ANION GAP SERPL CALC-SCNC: 10 MMOL/L (ref 8–16)
BASOPHILS # BLD AUTO: 0.03 K/UL (ref 0–0.2)
BASOPHILS NFR BLD: 0.5 % (ref 0–1.9)
BNP SERPL-MCNC: <10 PG/ML (ref 0–99)
BUN SERPL-MCNC: 12 MG/DL (ref 6–20)
CALCIUM SERPL-MCNC: 10.1 MG/DL (ref 8.7–10.5)
CHLORIDE SERPL-SCNC: 106 MMOL/L (ref 95–110)
CO2 SERPL-SCNC: 27 MMOL/L (ref 23–29)
CREAT SERPL-MCNC: 1.1 MG/DL (ref 0.5–1.4)
DIFFERENTIAL METHOD BLD: ABNORMAL
EOSINOPHIL # BLD AUTO: 0.1 K/UL (ref 0–0.5)
EOSINOPHIL NFR BLD: 1.6 % (ref 0–8)
ERYTHROCYTE [DISTWIDTH] IN BLOOD BY AUTOMATED COUNT: 12.3 % (ref 11.5–14.5)
EST. GFR  (NO RACE VARIABLE): >60 ML/MIN/1.73 M^2
GLUCOSE SERPL-MCNC: 105 MG/DL (ref 70–110)
HCT VFR BLD AUTO: 49.1 % (ref 40–54)
HGB BLD-MCNC: 16.4 G/DL (ref 14–18)
IMM GRANULOCYTES # BLD AUTO: 0.03 K/UL (ref 0–0.04)
IMM GRANULOCYTES NFR BLD AUTO: 0.5 % (ref 0–0.5)
LYMPHOCYTES # BLD AUTO: 2.3 K/UL (ref 1–4.8)
LYMPHOCYTES NFR BLD: 36.1 % (ref 18–48)
MCH RBC QN AUTO: 31.1 PG (ref 27–31)
MCHC RBC AUTO-ENTMCNC: 33.4 G/DL (ref 32–36)
MCV RBC AUTO: 93 FL (ref 82–98)
MONOCYTES # BLD AUTO: 0.5 K/UL (ref 0.3–1)
MONOCYTES NFR BLD: 8.5 % (ref 4–15)
NEUTROPHILS # BLD AUTO: 3.4 K/UL (ref 1.8–7.7)
NEUTROPHILS NFR BLD: 52.8 % (ref 38–73)
NRBC BLD-RTO: 0 /100 WBC
PLATELET # BLD AUTO: 281 K/UL (ref 150–450)
PMV BLD AUTO: 9.1 FL (ref 9.2–12.9)
POTASSIUM SERPL-SCNC: 4.6 MMOL/L (ref 3.5–5.1)
RBC # BLD AUTO: 5.27 M/UL (ref 4.6–6.2)
SODIUM SERPL-SCNC: 143 MMOL/L (ref 136–145)
TROPONIN I SERPL DL<=0.01 NG/ML-MCNC: <0.006 NG/ML (ref 0–0.03)
WBC # BLD AUTO: 6.37 K/UL (ref 3.9–12.7)

## 2024-02-07 PROCEDURE — 83880 ASSAY OF NATRIURETIC PEPTIDE: CPT | Performed by: STUDENT IN AN ORGANIZED HEALTH CARE EDUCATION/TRAINING PROGRAM

## 2024-02-07 PROCEDURE — 85025 COMPLETE CBC W/AUTO DIFF WBC: CPT | Performed by: STUDENT IN AN ORGANIZED HEALTH CARE EDUCATION/TRAINING PROGRAM

## 2024-02-07 PROCEDURE — 99900031 HC PATIENT EDUCATION (STAT)

## 2024-02-07 PROCEDURE — 84484 ASSAY OF TROPONIN QUANT: CPT | Performed by: STUDENT IN AN ORGANIZED HEALTH CARE EDUCATION/TRAINING PROGRAM

## 2024-02-07 PROCEDURE — 99900035 HC TECH TIME PER 15 MIN (STAT)

## 2024-02-07 PROCEDURE — 93005 ELECTROCARDIOGRAM TRACING: CPT

## 2024-02-07 PROCEDURE — 36415 COLL VENOUS BLD VENIPUNCTURE: CPT | Performed by: STUDENT IN AN ORGANIZED HEALTH CARE EDUCATION/TRAINING PROGRAM

## 2024-02-07 PROCEDURE — 99285 EMERGENCY DEPT VISIT HI MDM: CPT | Mod: 25

## 2024-02-07 PROCEDURE — 80048 BASIC METABOLIC PNL TOTAL CA: CPT | Performed by: STUDENT IN AN ORGANIZED HEALTH CARE EDUCATION/TRAINING PROGRAM

## 2024-02-07 PROCEDURE — 93010 ELECTROCARDIOGRAM REPORT: CPT | Mod: ,,, | Performed by: INTERNAL MEDICINE

## 2024-02-07 PROCEDURE — 25000003 PHARM REV CODE 250: Performed by: STUDENT IN AN ORGANIZED HEALTH CARE EDUCATION/TRAINING PROGRAM

## 2024-02-07 RX ORDER — KETOROLAC TROMETHAMINE 10 MG/1
10 TABLET, FILM COATED ORAL
Status: COMPLETED | OUTPATIENT
Start: 2024-02-07 | End: 2024-02-07

## 2024-02-07 RX ORDER — KETOROLAC TROMETHAMINE 10 MG/1
10 TABLET, FILM COATED ORAL EVERY 6 HOURS PRN
Qty: 30 TABLET | Refills: 0 | Status: SHIPPED | OUTPATIENT
Start: 2024-02-07

## 2024-02-07 RX ADMIN — KETOROLAC TROMETHAMINE 10 MG: 10 TABLET, FILM COATED ORAL at 09:02

## 2024-02-07 NOTE — Clinical Note
"William Padillaer" Raul was seen and treated in our emergency department on 2/7/2024.  He may return to work on 02/09/2024.       If you have any questions or concerns, please don't hesitate to call.      Piter Atwood, DO"

## 2024-02-08 LAB
OHS QRS DURATION: 86 MS
OHS QTC CALCULATION: 407 MS

## 2024-02-08 NOTE — ED PROVIDER NOTES
"Encounter Date: 2/7/2024       History     Chief Complaint   Patient presents with    Chest Pain     Pt to ED with c/o R sided chest pain that began while at work, reports subsided some but still "Dull". Pt's blood pressure elevated at work. Denies cardiac history.      23 year old male with a PMHx of panic attacks, depression presents to the ED with his friend with right-mid chest wall pain. Started 7pm and made him hunch over. Radiates across the lower chest bilaterally. He was at his work (works at the [employer]) and was sent to the doctor on site who noticed his BP is elevated. No hypertension history. No 1st degree relatives with heart disease or sudden cardiac. Vapes occasionally. No regular meds, supplements.         Review of patient's allergies indicates:  No Known Allergies  Past Medical History:   Diagnosis Date    Concussion     may 2014    Depression     History of psychiatric hospitalization     Panic attacks      Past Surgical History:   Procedure Laterality Date    ESOPHAGOGASTRODUODENOSCOPY N/A 8/15/2018    Procedure: ESOPHAGOGASTRODUODENOSCOPY (EGD);  Surgeon: Fatmata Lam MD;  Location: [address];  Service: Endoscopy;  Laterality: N/A;     Family History   Problem Relation Age of Onset    Hypertension Father     Diabetes Father     Depression Maternal Grandmother         situational    Diabetes Paternal Grandmother     Diabetes Paternal Grandfather     Bipolar disorder Mother     Drug abuse Mother      Social History     Tobacco Use    Smoking status: Never    Smokeless tobacco: Never    Tobacco comments:     Dad smokes outside   Substance Use Topics    Alcohol use: No    Drug use: No     Review of Systems   Constitutional:  Negative for chills and fever.   HENT:  Negative for congestion, rhinorrhea and sneezing.    Eyes:  Negative for discharge and redness.   Respiratory:  Positive for shortness of breath. Negative for cough.    Cardiovascular:  Positive for chest pain. Negative for " palpitations.   Gastrointestinal:  Negative for abdominal pain, diarrhea and vomiting.   Genitourinary:  Negative for difficulty urinating, flank pain and urgency.   Musculoskeletal:  Negative for back pain and neck pain.   Skin:  Negative for rash and wound.   Neurological:  Negative for weakness, numbness and headaches.       Physical Exam     Initial Vitals [02/07/24 2011]   BP Pulse Resp Temp SpO2   (!) 141/107 84 18 98.8 °F (37.1 °C) 99 %      MAP       --         Physical Exam    Nursing note and vitals reviewed.  Constitutional: He appears well-developed. He is not diaphoretic. No distress.   HENT:   Head: Normocephalic and atraumatic.   Right Ear: External ear normal.   Left Ear: External ear normal.   Nose: Nose normal.   Eyes: Conjunctivae are normal. Right eye exhibits no discharge. Left eye exhibits no discharge. No scleral icterus.   Cardiovascular:  Normal rate and regular rhythm.           Pulmonary/Chest: Breath sounds normal. No stridor. No respiratory distress. He has no wheezes. He has no rhonchi. He has no rales. He exhibits tenderness.     Abdominal: Abdomen is soft. He exhibits no distension. There is no abdominal tenderness. There is no guarding.   Musculoskeletal:         General: No edema.     Neurological: He is alert and oriented to person, place, and time. GCS score is 15. GCS eye subscore is 4. GCS verbal subscore is 5. GCS motor subscore is 6.   Skin: Skin is warm and dry. Capillary refill takes less than 2 seconds.   Psychiatric: He has a normal mood and affect.         ED Course   Procedures  Labs Reviewed   CBC W/ AUTO DIFFERENTIAL - Abnormal; Notable for the following components:       Result Value    MCH 31.1 (*)     MPV 9.1 (*)     All other components within normal limits   BASIC METABOLIC PANEL   TROPONIN I   B-TYPE NATRIURETIC PEPTIDE     EKG Readings: (Independently Interpreted)   NSR at 77 bpm. Normal axis. Normal intervals. No STEMI. No prior ECG for comparison.        Imaging Results              X-Ray Chest AP Portable (Final result)  Result time 02/07/24 21:12:14      Final result by William Hernandez MD (02/07/24 21:12:14)                   Impression:      No radiographic evidence of acute intrathoracic process on this single view.      Electronically signed by: William Hernandez MD  Date:    02/07/2024  Time:    21:12               Narrative:    EXAMINATION:  XR CHEST AP PORTABLE    CLINICAL HISTORY:  Chest pain, unspecified    TECHNIQUE:  Single frontal view of the chest was performed.    COMPARISON:  None    FINDINGS:  The cardiac silhouette appears within normal limits.  There is mild elevation of the right hemidiaphragm.  No confluent airspace consolidation identified.  No significant volume of pleural fluid or pneumothorax appreciated.  The visualized osseous structures appear intact.                                       Medications   ketorolac tablet 10 mg (has no administration in time range)     Medical Decision Making  Ddx: costochondritis, hypertensive emergency, ACS, cardiac dysrhythmia, PNA, PE    23 year old with HTN, chest wall pain. Suspect costochondritis. Negative ECG, trop, bnp, labs, cxr. Given PO toradol. Bp elevated but downtrending - will need recheck with PCP in case it's still elevated and needs Bp meds. Patient is in agreement.    Amount and/or Complexity of Data Reviewed  Labs: ordered.  Radiology: ordered.    Risk  Prescription drug management.                                      PERC Rule for Pulmonary Embolism    n Age > 50?   n HR > 100?   n O2 sat on room air <95%?   n Unilateral leg swelling?   n Hemoptysis?   n Recent surgery or trauma?  Surgery or trauma < 4 weeks ago requiring treatment with general anesthesia   n Prior PE or DVT?   n Hormone Use?  Oral contraceptives, hormone replacement or estrogenic hormones use in male or female patients   If any criteria are positive, the PERC rule cannot be used to rule out PE in this patient.          Clinical Impression:  Final diagnoses:  [R07.9] Acute chest pain (Primary)  [I10] Hypertension, unspecified type  [M94.0] Acute costochondritis          ED Disposition Condition    Discharge Stable          ED Prescriptions       Medication Sig Dispense Start Date End Date Auth. Provider    ketorolac (TORADOL) 10 mg tablet Take 1 tablet (10 mg total) by mouth every 6 (six) hours as needed for Pain. 30 tablet 2/7/2024 -- Piter Atwood DO          Follow-up Information       Follow up With Specialties Details Why Contact Info    Jordi Piper MD Family Medicine Schedule an appointment as soon as possible for a visit in 1 week  [address]  [place] [address]  [phone]               Piter Atwood DO  02/07/24 8747

## 2024-02-08 NOTE — PROGRESS NOTES
SUBJECTIVE     Chief Complaint   Patient presents with    Anxiety       HPI  Wililam Carter is a 23 y.o. male with  Anxiety, depression, h/o psychiatric hospitalization  that presents for  ED follow up exam .     Seen in the ED on 2/7/24 for acute onset of chest pain that started several hourse before presentation. Work up including EKG, troponin, x-ray were non-revealing. Chest pain was reproducible with palpation on physical exam. Patient given toradol in the ED with improvement of symptoms. Discharged with prescription for PO toradol.     Of note, BP was elevated in the ED. Was 141/107 on presentation, HR 84 bpm. BP downtrended to 121/91 with treatment and upon discharge. No prior history of HTN. Normotensive in clinic today.     Reports that since the ED he has not experienced any chest pain, palpitations, SOB.     Went through a break up in December 2023. Finds himself more irritable and easily angered than before. Reports increase of stress seizure--has been evaluated by Neurology and diagnosed with PNS. Reporting 1-2 times a week when he is relaxing. He is sleeping more than normal -- now sleeping 12 hours a day without refreshing sleep, less energy. Mood has been down. No SI/HI/AVH.   Previously on medication for anxiety. Reporting increased anxiety today.   Medications tried: Fluoxetine, Sertraline, Venlafaxine, Lorazepam, Alprazolam, Clonazepam (reports increased anger on this medication), Trazodone, Concerta.    Requesting screening for STI. Asymptomatic.   No history of STIs.   Patient is MSM. Does not perform oral sex. Engages in anal intercourse, is not the receptive partner.   Consistently uses protection.   Interested in PrEP for HIV.     PAST MEDICAL HISTORY:  Past Medical History:   Diagnosis Date    Concussion     may 2014    Depression     History of psychiatric hospitalization     Panic attacks        PAST SURGICAL HISTORY:  Past Surgical History:   Procedure Laterality Date     ESOPHAGOGASTRODUODENOSCOPY N/A 8/15/2018    Procedure: ESOPHAGOGASTRODUODENOSCOPY (EGD);  Surgeon: Fatmata Lam MD;  Location: [address]);  Service: Endoscopy;  Laterality: N/A;       FAMILY HISTORY:  Family History   Problem Relation Age of Onset    Hypertension Father     Diabetes Father     Depression Maternal Grandmother         situational    Diabetes Paternal Grandmother     Diabetes Paternal Grandfather     Bipolar disorder Mother     Drug abuse Mother        ALLERGIES AND MEDICATIONS: updated and reviewed.  Review of patient's allergies indicates:  No Known Allergies  Current Outpatient Medications   Medication Sig Dispense Refill    ketorolac (TORADOL) 10 mg tablet Take 1 tablet (10 mg total) by mouth every 6 (six) hours as needed for Pain. 30 tablet 0     No current facility-administered medications for this visit.       ROS  Review of Systems   Constitutional:  Negative for activity change, chills and fever.   HENT:  Negative for congestion and hearing loss.    Eyes:  Negative for pain and visual disturbance.   Respiratory:  Negative for cough and shortness of breath.    Cardiovascular:  Negative for chest pain and palpitations.   Gastrointestinal:  Negative for abdominal pain, constipation, diarrhea, nausea and vomiting.   Endocrine: Negative.    Genitourinary: Negative.    Musculoskeletal:  Negative for arthralgias and myalgias.   Skin: Negative.    Allergic/Immunologic: Negative.    Neurological:  Negative for dizziness, light-headedness and headaches.   Hematological: Negative.    Psychiatric/Behavioral:  Positive for decreased concentration and dysphoric mood. Negative for hallucinations, self-injury, sleep disturbance and suicidal ideas. The patient is nervous/anxious. The patient is not hyperactive.          OBJECTIVE     Physical Exam  Vitals:    02/09/24 1454   BP: 125/89   Pulse: 78    Body mass index is 33.06 kg/m².            Physical Exam  Vitals reviewed.   Constitutional:        General: He is not in acute distress.     Appearance: Normal appearance.   HENT:      Head: Normocephalic and atraumatic.      Mouth/Throat:      Mouth: Mucous membranes are moist.      Pharynx: Oropharynx is clear.   Eyes:      Extraocular Movements: Extraocular movements intact.      Conjunctiva/sclera: Conjunctivae normal.      Pupils: Pupils are equal, round, and reactive to light.   Cardiovascular:      Rate and Rhythm: Normal rate and regular rhythm.      Pulses: Normal pulses.      Heart sounds: Normal heart sounds.   Pulmonary:      Effort: Pulmonary effort is normal.      Breath sounds: Normal breath sounds.   Abdominal:      General: There is no distension.   Musculoskeletal:         General: Normal range of motion.      Cervical back: Normal range of motion and neck supple.   Skin:     General: Skin is warm and dry.   Neurological:      General: No focal deficit present.      Mental Status: He is alert.   Psychiatric:         Mood and Affect: Mood and affect normal.           Health Maintenance         Date Due Completion Date    Hepatitis C Screening Never done ---    HIV Screening Never done ---    Influenza Vaccine (1) 09/01/2023 10/11/2019    COVID-19 Vaccine (3 - 2023-24 season) 09/01/2023 6/4/2021    Hemoglobin A1c (Prediabetes) 04/12/2024 4/12/2023    TETANUS VACCINE 03/08/2029 3/8/2019              ASSESSMENT     23 y.o. male with     1. Generalized anxiety disorder    2. Panic disorder    3. Routine screening for STI (sexually transmitted infection)    4. Psychogenic nonepileptic seizure    5. High risk homosexual behavior        PLAN:     1. Generalized anxiety disorder  - Restart SSRI with benzodiazepine bridge.  reviewed.  - Virtual visit in 1 month to assess response, titrate medication as needed.   - FLUoxetine 10 MG capsule; Take 1 capsule (10 mg total) by mouth once daily.  Dispense: 30 capsule; Refill: 11  - LORazepam (ATIVAN) 1 MG tablet; Take 1 tablet (1 mg total) by mouth every 12  (twelve) hours as needed for Anxiety.  Dispense: 60 tablet; Refill: 0    2. Panic disorder  - As per #1.   - FLUoxetine 10 MG capsule; Take 1 capsule (10 mg total) by mouth once daily.  Dispense: 30 capsule; Refill: 11  - LORazepam (ATIVAN) 1 MG tablet; Take 1 tablet (1 mg total) by mouth every 12 (twelve) hours as needed for Anxiety.  Dispense: 60 tablet; Refill: 0    3. Routine screening for STI (sexually transmitted infection)  - Asymptomatic.   - C. trachomatis/N. gonorrhoeae by AMP DNA [facility] [place]; Urine; Future  - Hepatitis Panel, Acute; Future  - HIV 1/2 Ag/Ab (4th Gen); Future  - RPR; Future    4. Psychogenic nonepileptic seizure  - Management of ARCENIO/Panic Disorder as above.   - FLUoxetine 10 MG capsule; Take 1 capsule (10 mg total) by mouth once daily.  Dispense: 30 capsule; Refill: 11  - LORazepam (ATIVAN) 1 MG tablet; Take 1 tablet (1 mg total) by mouth every 12 (twelve) hours as needed for Anxiety.  Dispense: 60 tablet; Refill: 0    5. High risk homosexual behavior  - STI screening as above.   - Discussed PrEP for HIV, patient interested.   - Counseled on safe sex practices.   - HBV, Hep A vaccinations are up to date.         Virtual visit in 1 month       Jordi Piper MD  Family Medicine  [facility]  02/08/2024    This document was created using voice recognition software (M*Modal Fluency Direct). Although it may be edited, this document may contain errors related to incorrect recognition of the spoken word. Please call the physician if clarification is needed.       No follow-ups on file.

## 2024-02-09 ENCOUNTER — LAB VISIT (OUTPATIENT)
Dept: LAB | Facility: HOSPITAL | Age: 24
End: 2024-02-09
Attending: STUDENT IN AN ORGANIZED HEALTH CARE EDUCATION/TRAINING PROGRAM
Payer: COMMERCIAL

## 2024-02-09 ENCOUNTER — OFFICE VISIT (OUTPATIENT)
Dept: INTERNAL MEDICINE | Facility: CLINIC | Age: 24
End: 2024-02-09
Payer: COMMERCIAL

## 2024-02-09 VITALS
BODY MASS INDEX: 33.06 KG/M2 | WEIGHT: 230.38 LBS | OXYGEN SATURATION: 99 % | HEART RATE: 78 BPM | SYSTOLIC BLOOD PRESSURE: 125 MMHG | DIASTOLIC BLOOD PRESSURE: 89 MMHG

## 2024-02-09 DIAGNOSIS — F41.1 GENERALIZED ANXIETY DISORDER: Primary | ICD-10-CM

## 2024-02-09 DIAGNOSIS — F41.0 PANIC DISORDER: ICD-10-CM

## 2024-02-09 DIAGNOSIS — Z72.52 HIGH RISK HOMOSEXUAL BEHAVIOR: ICD-10-CM

## 2024-02-09 DIAGNOSIS — F44.5 PSYCHOGENIC NONEPILEPTIC SEIZURE: ICD-10-CM

## 2024-02-09 DIAGNOSIS — Z11.3 ROUTINE SCREENING FOR STI (SEXUALLY TRANSMITTED INFECTION): ICD-10-CM

## 2024-02-09 PROCEDURE — 3074F SYST BP LT 130 MM HG: CPT | Mod: CPTII,S$GLB,, | Performed by: STUDENT IN AN ORGANIZED HEALTH CARE EDUCATION/TRAINING PROGRAM

## 2024-02-09 PROCEDURE — 1160F RVW MEDS BY RX/DR IN RCRD: CPT | Mod: CPTII,S$GLB,, | Performed by: STUDENT IN AN ORGANIZED HEALTH CARE EDUCATION/TRAINING PROGRAM

## 2024-02-09 PROCEDURE — 99214 OFFICE O/P EST MOD 30 MIN: CPT | Mod: S$GLB,,, | Performed by: STUDENT IN AN ORGANIZED HEALTH CARE EDUCATION/TRAINING PROGRAM

## 2024-02-09 PROCEDURE — 3008F BODY MASS INDEX DOCD: CPT | Mod: CPTII,S$GLB,, | Performed by: STUDENT IN AN ORGANIZED HEALTH CARE EDUCATION/TRAINING PROGRAM

## 2024-02-09 PROCEDURE — 87491 CHLMYD TRACH DNA AMP PROBE: CPT | Performed by: STUDENT IN AN ORGANIZED HEALTH CARE EDUCATION/TRAINING PROGRAM

## 2024-02-09 PROCEDURE — 3079F DIAST BP 80-89 MM HG: CPT | Mod: CPTII,S$GLB,, | Performed by: STUDENT IN AN ORGANIZED HEALTH CARE EDUCATION/TRAINING PROGRAM

## 2024-02-09 PROCEDURE — 1159F MED LIST DOCD IN RCRD: CPT | Mod: CPTII,S$GLB,, | Performed by: STUDENT IN AN ORGANIZED HEALTH CARE EDUCATION/TRAINING PROGRAM

## 2024-02-09 PROCEDURE — 99999 PR PBB SHADOW E&M-EST. PATIENT-LVL III: CPT | Mod: PBBFAC,,, | Performed by: STUDENT IN AN ORGANIZED HEALTH CARE EDUCATION/TRAINING PROGRAM

## 2024-02-09 RX ORDER — LORAZEPAM 1 MG/1
1 TABLET ORAL EVERY 12 HOURS PRN
Qty: 60 TABLET | Refills: 0 | Status: SHIPPED | OUTPATIENT
Start: 2024-02-09 | End: 2024-03-13

## 2024-02-09 RX ORDER — FLUOXETINE 10 MG/1
10 CAPSULE ORAL DAILY
Qty: 30 CAPSULE | Refills: 11 | Status: SHIPPED | OUTPATIENT
Start: 2024-02-09 | End: 2025-02-08

## 2024-02-12 ENCOUNTER — PATIENT MESSAGE (OUTPATIENT)
Dept: INTERNAL MEDICINE | Facility: CLINIC | Age: 24
End: 2024-02-12
Payer: COMMERCIAL

## 2024-02-12 DIAGNOSIS — Z72.52 HIGH RISK HOMOSEXUAL BEHAVIOR: Primary | ICD-10-CM

## 2024-02-12 RX ORDER — EMTRICITABINE AND TENOFOVIR DISOPROXIL FUMARATE 200; 300 MG/1; MG/1
1 TABLET, FILM COATED ORAL DAILY
Qty: 90 TABLET | Refills: 0 | Status: ACTIVE | OUTPATIENT
Start: 2024-02-12 | End: 2024-05-31 | Stop reason: SDUPTHER

## 2024-02-14 PROBLEM — Z79.899 ON PRE-EXPOSURE PROPHYLAXIS FOR HIV: Status: ACTIVE | Noted: 2024-02-14

## 2024-02-15 LAB
C TRACH DNA SPEC QL NAA+PROBE: NOT DETECTED
N GONORRHOEA DNA SPEC QL NAA+PROBE: NOT DETECTED

## 2024-02-20 ENCOUNTER — HOSPITAL ENCOUNTER (EMERGENCY)
Facility: HOSPITAL | Age: 24
Discharge: HOME | End: 2024-02-20
Attending: EMERGENCY MEDICINE
Payer: COMMERCIAL

## 2024-02-20 VITALS
TEMPERATURE: 98 F | RESPIRATION RATE: 16 BRPM | SYSTOLIC BLOOD PRESSURE: 133 MMHG | OXYGEN SATURATION: 95 % | HEART RATE: 89 BPM | DIASTOLIC BLOOD PRESSURE: 94 MMHG | HEIGHT: 70 IN | WEIGHT: 220 LBS | BODY MASS INDEX: 31.5 KG/M2

## 2024-02-20 DIAGNOSIS — F41.9 ANXIETY: ICD-10-CM

## 2024-02-20 DIAGNOSIS — R07.9 CHEST PAIN, UNSPECIFIED TYPE: Primary | ICD-10-CM

## 2024-02-20 DIAGNOSIS — R11.0 NAUSEA: ICD-10-CM

## 2024-02-20 DIAGNOSIS — R07.9 CHEST PAIN: ICD-10-CM

## 2024-02-20 LAB
ALBUMIN SERPL BCP-MCNC: 4.5 G/DL (ref 3.5–5.2)
ALP SERPL-CCNC: 67 U/L (ref 55–135)
ALT SERPL W/O P-5'-P-CCNC: 52 U/L (ref 10–44)
ANION GAP SERPL CALC-SCNC: 11 MMOL/L (ref 8–16)
AST SERPL-CCNC: 28 U/L (ref 10–40)
BASOPHILS # BLD AUTO: 0.04 K/UL (ref 0–0.2)
BASOPHILS NFR BLD: 0.4 % (ref 0–1.9)
BILIRUB SERPL-MCNC: 0.6 MG/DL (ref 0.1–1)
BUN SERPL-MCNC: 13 MG/DL (ref 6–20)
CALCIUM SERPL-MCNC: 9.7 MG/DL (ref 8.7–10.5)
CHLORIDE SERPL-SCNC: 105 MMOL/L (ref 95–110)
CO2 SERPL-SCNC: 23 MMOL/L (ref 23–29)
CREAT SERPL-MCNC: 1.1 MG/DL (ref 0.5–1.4)
DIFFERENTIAL METHOD BLD: ABNORMAL
EOSINOPHIL # BLD AUTO: 0.1 K/UL (ref 0–0.5)
EOSINOPHIL NFR BLD: 0.8 % (ref 0–8)
ERYTHROCYTE [DISTWIDTH] IN BLOOD BY AUTOMATED COUNT: 12.2 % (ref 11.5–14.5)
EST. GFR  (NO RACE VARIABLE): >60 ML/MIN/1.73 M^2
GLUCOSE SERPL-MCNC: 185 MG/DL (ref 70–110)
HCT VFR BLD AUTO: 46 % (ref 40–54)
HGB BLD-MCNC: 15.8 G/DL (ref 14–18)
IMM GRANULOCYTES # BLD AUTO: 0.02 K/UL (ref 0–0.04)
IMM GRANULOCYTES NFR BLD AUTO: 0.2 % (ref 0–0.5)
LYMPHOCYTES # BLD AUTO: 2.8 K/UL (ref 1–4.8)
LYMPHOCYTES NFR BLD: 30.4 % (ref 18–48)
MCH RBC QN AUTO: 31.5 PG (ref 27–31)
MCHC RBC AUTO-ENTMCNC: 34.3 G/DL (ref 32–36)
MCV RBC AUTO: 92 FL (ref 82–98)
MONOCYTES # BLD AUTO: 0.6 K/UL (ref 0.3–1)
MONOCYTES NFR BLD: 6.7 % (ref 4–15)
NEUTROPHILS # BLD AUTO: 5.6 K/UL (ref 1.8–7.7)
NEUTROPHILS NFR BLD: 61.5 % (ref 38–73)
NRBC BLD-RTO: 0 /100 WBC
PLATELET # BLD AUTO: 319 K/UL (ref 150–450)
PMV BLD AUTO: 9.6 FL (ref 9.2–12.9)
POTASSIUM SERPL-SCNC: 3.6 MMOL/L (ref 3.5–5.1)
PROT SERPL-MCNC: 7.5 G/DL (ref 6–8.4)
RBC # BLD AUTO: 5.01 M/UL (ref 4.6–6.2)
SODIUM SERPL-SCNC: 139 MMOL/L (ref 136–145)
TROPONIN I SERPL DL<=0.01 NG/ML-MCNC: <0.006 NG/ML (ref 0–0.03)
TROPONIN I SERPL DL<=0.01 NG/ML-MCNC: <0.006 NG/ML (ref 0–0.03)
WBC # BLD AUTO: 9.04 K/UL (ref 3.9–12.7)

## 2024-02-20 PROCEDURE — 94760 N-INVAS EAR/PLS OXIMETRY 1: CPT

## 2024-02-20 PROCEDURE — 80053 COMPREHEN METABOLIC PANEL: CPT | Performed by: EMERGENCY MEDICINE

## 2024-02-20 PROCEDURE — 25000003 PHARM REV CODE 250: Performed by: EMERGENCY MEDICINE

## 2024-02-20 PROCEDURE — 93005 ELECTROCARDIOGRAM TRACING: CPT

## 2024-02-20 PROCEDURE — 99284 EMERGENCY DEPT VISIT MOD MDM: CPT | Mod: 25

## 2024-02-20 PROCEDURE — 36415 COLL VENOUS BLD VENIPUNCTURE: CPT | Performed by: EMERGENCY MEDICINE

## 2024-02-20 PROCEDURE — 93010 ELECTROCARDIOGRAM REPORT: CPT | Mod: ,,, | Performed by: INTERNAL MEDICINE

## 2024-02-20 PROCEDURE — 84484 ASSAY OF TROPONIN QUANT: CPT | Performed by: EMERGENCY MEDICINE

## 2024-02-20 PROCEDURE — 63600175 PHARM REV CODE 636 W HCPCS: Performed by: EMERGENCY MEDICINE

## 2024-02-20 PROCEDURE — 85025 COMPLETE CBC W/AUTO DIFF WBC: CPT | Performed by: EMERGENCY MEDICINE

## 2024-02-20 PROCEDURE — 96374 THER/PROPH/DIAG INJ IV PUSH: CPT

## 2024-02-20 RX ORDER — ASPIRIN 325 MG
325 TABLET ORAL
Status: COMPLETED | OUTPATIENT
Start: 2024-02-20 | End: 2024-02-20

## 2024-02-20 RX ORDER — LORAZEPAM 2 MG/ML
1 INJECTION INTRAMUSCULAR
Status: COMPLETED | OUTPATIENT
Start: 2024-02-20 | End: 2024-02-20

## 2024-02-20 RX ADMIN — ASPIRIN 325 MG ORAL TABLET 325 MG: 325 PILL ORAL at 06:02

## 2024-02-20 RX ADMIN — LORAZEPAM 1 MG: 2 INJECTION INTRAMUSCULAR; INTRAVENOUS at 06:02

## 2024-02-20 NOTE — Clinical Note
"William Lala" Raul was seen and treated in our emergency department on 2/20/2024.  He may return to work on 02/22/2024.       If you have any questions or concerns, please don't hesitate to call.       RN    "

## 2024-02-21 LAB
OHS QRS DURATION: 90 MS
OHS QTC CALCULATION: 410 MS

## 2024-02-21 NOTE — ED PROVIDER NOTES
Encounter Date: 2/20/2024       History     Chief Complaint   Patient presents with    Abdominal Pain     23-year-old male with past medical history significant for anxiety, depression, panic attacks, came in complaining of episode of chest pain.    Patient says he was lying playing a game on his phone when he suddenly felt sharp pain in his chest which lasted for about 10 minutes.  Patient admit to going through lots of stress, he says he has been dealing with separation from his significant other and under lots of stress.  Patient says he has been taking his anxiety and depression medication.    Patient says he has been having episodes of loose temperature at work and having difficulty dealing with his situation.    Patient denies any suicidal or homicidal ideations or thoughts.        Review of patient's allergies indicates:  No Known Allergies  Past Medical History:   Diagnosis Date    Concussion     may 2014    Depression     History of psychiatric hospitalization     Panic attacks      Past Surgical History:   Procedure Laterality Date    ESOPHAGOGASTRODUODENOSCOPY N/A 8/15/2018    Procedure: ESOPHAGOGASTRODUODENOSCOPY (EGD);  Surgeon: Fatmata Lam MD;  Location: [address];  Service: Endoscopy;  Laterality: N/A;     Family History   Problem Relation Age of Onset    Hypertension Father     Diabetes Father     Depression Maternal Grandmother         situational    Diabetes Paternal Grandmother     Diabetes Paternal Grandfather     Bipolar disorder Mother     Drug abuse Mother      Social History     Tobacco Use    Smoking status: Never    Smokeless tobacco: Never    Tobacco comments:     Dad smokes outside   Substance Use Topics    Alcohol use: No    Drug use: No     Review of Systems   Constitutional: Negative.    HENT: Negative.     Eyes: Negative.    Respiratory: Negative.     Cardiovascular:  Positive for chest pain.   Gastrointestinal: Negative.    Endocrine: Negative.    Genitourinary:  Negative.    Musculoskeletal: Negative.    Allergic/Immunologic: Negative.    Neurological: Negative.    Hematological: Negative.    Psychiatric/Behavioral: Negative.         Physical Exam     Initial Vitals   BP Pulse Resp Temp SpO2   02/20/24 1827 02/20/24 1827 02/20/24 1827 02/20/24 1829 02/20/24 1827   (!) 158/102 84 16 98 °F (36.7 °C) 98 %      MAP       --                Physical Exam  HEENT:  Pupils equal round reactive to light and accommodation, extra ocular muscle intact  NECK:  Supple, no lymphadenopathy, no carotid bruit, midline trachea.  LUNGS:  Clear to auscultation, no wheeze or rhonchi, no rales.  HEART:  S1-S2 regular, no murmurs  ABD:  Soft, nontender, bowel sounds positive, no visceromegaly, no tenderness on McBurney's point, Farris signs negative.  EXT:  No edema, cyanosis, clubbing  NEURO:  Alert, oriented x3, Cranial nerves 2-12 grossly intact, power 5 x 5 bilaterally, normal sense sensation bilaterally, DTRs 2+ bilaterally, plantar downward bilaterally.  Finger-nose negative bilaterally, Romberg sign negative.  ED Course   Procedures  Labs Reviewed   CBC W/ AUTO DIFFERENTIAL - Abnormal; Notable for the following components:       Result Value    MCH 31.5 (*)     All other components within normal limits   COMPREHENSIVE METABOLIC PANEL - Abnormal; Notable for the following components:    Glucose 185 (*)     ALT 52 (*)     All other components within normal limits   TROPONIN I   TROPONIN I     EKG Readings: (Independently Interpreted)   EKG 1.  Normal sinus rhythm at 79, nonspecific ST-T changes.  Normal intervals.    EKG 2.  Done at 21 36  Normal sinus rhythm at 75 no acute ST-T changes       Imaging Results    None          Medications   aspirin tablet 325 mg (325 mg Oral Given 2/20/24 1845)   LORazepam injection 1 mg (1 mg Intravenous Given 2/20/24 1845)     Medical Decision Making  10:18 p.m.    23-year-old male who is going through stress and anxiety secondary to breaking off from his  girlfriend.  Came in with complaints of chest pain which lasted for about 10 minutes.    Patient exam is essentially unremarkable.    Patient workup done including troponin x2 and EKG x2.  Patient is asymptomatic now wants to go home.    I discussed with patient at length, he will continue taking his antidepressant and anti anxiety medication he will follow up with PCP.    He is aware if any worsening symptoms he will return to ER promptly.    Amount and/or Complexity of Data Reviewed  Labs: ordered.    Risk  OTC drugs.  Prescription drug management.                                      Clinical Impression:  Final diagnoses:  [R07.9] Chest pain, unspecified type (Primary)  [F41.9] Anxiety          ED Disposition Condition    Discharge Stable          ED Prescriptions    None       Follow-up Information       Follow up With Specialties Details Why Contact Info    Jordi Piper MD Family Medicine In 1 day  [address]  [place] [address]  [phone]               Haider Sheth MD  02/20/24 0342

## 2024-02-21 NOTE — ED TRIAGE NOTES
Patient to ED per [facility] with c/o upper abdominal pain and nausea today. Patient reports took the pain medicine he was given the last time he was seen in the ED for this same pain.

## 2024-02-21 NOTE — DISCHARGE INSTRUCTIONS
Observe closely, continue taking present medication.  If you have any worsening symptoms please return to ER promptly.

## 2024-02-22 LAB
OHS QRS DURATION: 88 MS
OHS QTC CALCULATION: 396 MS

## 2024-03-13 ENCOUNTER — OFFICE VISIT (OUTPATIENT)
Dept: INTERNAL MEDICINE | Facility: CLINIC | Age: 24
End: 2024-03-13
Payer: COMMERCIAL

## 2024-03-13 DIAGNOSIS — Z79.899 ON PRE-EXPOSURE PROPHYLAXIS FOR HIV: ICD-10-CM

## 2024-03-13 DIAGNOSIS — F32.A DEPRESSION, UNSPECIFIED DEPRESSION TYPE: ICD-10-CM

## 2024-03-13 DIAGNOSIS — F41.9 ANXIETY: Primary | ICD-10-CM

## 2024-03-13 PROCEDURE — 99214 OFFICE O/P EST MOD 30 MIN: CPT | Mod: 95,,, | Performed by: STUDENT IN AN ORGANIZED HEALTH CARE EDUCATION/TRAINING PROGRAM

## 2024-03-13 PROCEDURE — 1160F RVW MEDS BY RX/DR IN RCRD: CPT | Mod: CPTII,95,, | Performed by: STUDENT IN AN ORGANIZED HEALTH CARE EDUCATION/TRAINING PROGRAM

## 2024-03-13 PROCEDURE — 1159F MED LIST DOCD IN RCRD: CPT | Mod: CPTII,95,, | Performed by: STUDENT IN AN ORGANIZED HEALTH CARE EDUCATION/TRAINING PROGRAM

## 2024-03-13 RX ORDER — ALPRAZOLAM 0.5 MG/1
0.5 TABLET ORAL 3 TIMES DAILY
Qty: 90 TABLET | Refills: 1 | Status: SHIPPED | OUTPATIENT
Start: 2024-03-13 | End: 2024-05-12

## 2024-03-13 NOTE — PROGRESS NOTES
Subjective:       Patient ID: William Carter is a 23 y.o. male.    Chief Complaint: No chief complaint on file.      The patient location is: [place]  The chief complaint leading to consultation is: Anxiety follow up     Visit type: audiovisual    Face to Face time with patient: 7 minutes  15 minutes of total time spent on the encounter, which includes face to face time and non-face to face time preparing to see the patient (eg, review of tests), Obtaining and/or reviewing separately obtained history, Documenting clinical information in the electronic or other health record, Independently interpreting results (not separately reported) and communicating results to the patient/family/caregiver, or Care coordination (not separately reported).         Each patient to whom he or she provides medical services by telemedicine is:  (1) informed of the relationship between the physician and patient and the respective role of any other health care provider with respect to management of the patient; and (2) notified that he or she may decline to receive medical services by telemedicine and may withdraw from such care at any time.    Notes:     HPI  Patient states that anxiety better controlled on fluoxetine 10 mg.  Has had intermittent nausea, but has improved.  No vomiting, constipation, diarrhea, abdominal pain.  Patient states that he had been having to use p.r.n. lorazepam list frequently as time goes on.  Has noted that friends and family say he seems less irritable, in a better mood.  Does state that lorazepam does not help with breakthrough anxiety as much as it did before.    Patient recently started Truvada for HIV PrEP. Tolerating well. Compliant with medications. No complaints.   Review of Systems   Constitutional:  Negative for activity change and unexpected weight change.   HENT:  Negative for hearing loss, rhinorrhea and trouble swallowing.    Eyes:  Negative for discharge and visual disturbance.   Respiratory:   Negative for chest tightness and wheezing.    Cardiovascular:  Negative for chest pain and palpitations.   Gastrointestinal:  Negative for blood in stool, constipation, diarrhea and vomiting.   Endocrine: Negative for polydipsia and polyuria.   Genitourinary:  Negative for difficulty urinating, hematuria and urgency.   Musculoskeletal:  Negative for arthralgias, joint swelling and neck pain.   Neurological:  Negative for weakness and headaches.   Psychiatric/Behavioral:  Negative for confusion and dysphoric mood.          Objective:     Gen: Well Appearing, NAD, conversant.   HEENT: PERR, EOMI  Chest: Normal work of breathing  Resp: No Conversational Dyspnea  Neuro: A&Ox3,  speech normal  Mood: Mood normal, behavior normal, thought process linear     Assessment:       Problem List Items Addressed This Visit          Psychiatric    Depression    Anxiety - Primary    Relevant Medications    ALPRAZolam (XANAX) 0.5 MG tablet       ID    On pre-exposure prophylaxis for HIV       Plan:       William MORENO was seen today for anxiety.    Diagnoses and all orders for this visit:    Anxiety  Improved on Fluoxetine. Continue 10 mg qd.   Trial of Alprazolam for breakthrough anxiety given waned efficacy of Lorazepam.  reviewed.   -     ALPRAZolam (XANAX) 0.5 MG tablet; Take 1 tablet (0.5 mg total) by mouth 3 (three) times daily.    Depression, unspecified depression type  Improved on Fluoxetine. Continue.     On pre-exposure prophylaxis for HIV  Stable on Truvada. Continue.          RTC in 3 months.       Jordi Piper MD  Family Medicine  [facility]  03/13/2024

## 2024-03-14 ENCOUNTER — TELEPHONE (OUTPATIENT)
Dept: INTERNAL MEDICINE | Facility: CLINIC | Age: 24
End: 2024-03-14
Payer: COMMERCIAL

## 2024-03-14 NOTE — TELEPHONE ENCOUNTER
----- Message from Jordi Piper MD sent at 3/13/2024  5:07 PM CDT -----  Regarding: Appt  Please call to schedule in-person appt in 3 months

## 2024-04-17 ENCOUNTER — PATIENT MESSAGE (OUTPATIENT)
Dept: INTERNAL MEDICINE | Facility: CLINIC | Age: 24
End: 2024-04-17
Payer: COMMERCIAL

## 2024-04-17 NOTE — TELEPHONE ENCOUNTER
Pt c/I side effects from Prozac, asking for recommendations on what to do next. Pt c/o Headaches, Nausea, Trouble Sleeping, Tiredness, and Loss of appetite .

## 2024-05-27 ENCOUNTER — TELEPHONE (OUTPATIENT)
Dept: INTERNAL MEDICINE | Facility: CLINIC | Age: 24
End: 2024-05-27
Payer: COMMERCIAL

## 2024-05-28 ENCOUNTER — OFFICE VISIT (OUTPATIENT)
Dept: INTERNAL MEDICINE | Facility: CLINIC | Age: 24
End: 2024-05-28
Payer: COMMERCIAL

## 2024-05-28 DIAGNOSIS — F32.A DEPRESSION, UNSPECIFIED DEPRESSION TYPE: Primary | ICD-10-CM

## 2024-05-28 PROCEDURE — 99213 OFFICE O/P EST LOW 20 MIN: CPT | Mod: 95,,, | Performed by: STUDENT IN AN ORGANIZED HEALTH CARE EDUCATION/TRAINING PROGRAM

## 2024-05-28 PROCEDURE — 3044F HG A1C LEVEL LT 7.0%: CPT | Mod: CPTII,95,, | Performed by: STUDENT IN AN ORGANIZED HEALTH CARE EDUCATION/TRAINING PROGRAM

## 2024-05-28 PROCEDURE — 1160F RVW MEDS BY RX/DR IN RCRD: CPT | Mod: CPTII,95,, | Performed by: STUDENT IN AN ORGANIZED HEALTH CARE EDUCATION/TRAINING PROGRAM

## 2024-05-28 PROCEDURE — 1159F MED LIST DOCD IN RCRD: CPT | Mod: CPTII,95,, | Performed by: STUDENT IN AN ORGANIZED HEALTH CARE EDUCATION/TRAINING PROGRAM

## 2024-05-28 RX ORDER — BUPROPION HYDROCHLORIDE 150 MG/1
150 TABLET ORAL DAILY
Qty: 90 TABLET | Refills: 3 | Status: ON HOLD | OUTPATIENT
Start: 2024-05-28 | End: 2024-07-28 | Stop reason: HOSPADM

## 2024-05-28 NOTE — PROGRESS NOTES
Subjective:       Patient ID: William Carter is a 23 y.o. male.    Chief Complaint: No chief complaint on file.      The patient location is: [place], [place]  The chief complaint leading to consultation is: Depression    Visit type: audiovisual    Face to Face time with patient: 10 minutes  20 minutes of total time spent on the encounter, which includes face to face time and non-face to face time preparing to see the patient (eg, review of tests), Obtaining and/or reviewing separately obtained history, Documenting clinical information in the electronic or other health record, Independently interpreting results (not separately reported) and communicating results to the patient/family/caregiver, or Care coordination (not separately reported).         Each patient to whom he or she provides medical services by telemedicine is:  (1) informed of the relationship between the physician and patient and the respective role of any other health care provider with respect to management of the patient; and (2) notified that he or she may decline to receive medical services by telemedicine and may withdraw from such care at any time.    Notes:     HPI  William Carter is a 23 y.o. male with  Anxiety, depression, h/o psychiatric hospitalization  that presents for a virtual visit for medication management.     Patient started on fluoxetine 10 mg qd in 3/2024 for ARCENIO with panic attacks and depression. Had initially started on Lorazepam for breakthrough anxiety, but switched to Alprazolam 0.5 mg TID PRN for anxiety at follow up appointment. Patient was discontinued off of fluoxetine due to occipital headaches, nausea, difficulty sleeping, fatigue, and loss of appetite on medication. Stopped taking in early 4/2024 with resolution of the symptoms. Feels that his mood and anxiety are improved from before but still present--gets overwhelmed easily. No SI/HI. Since prescribing Alprazolam, has only had to take once.     Prior psychotropic  medications tried:   Medications tried: Fluoxetine, Sertraline, Venlafaxine, Lorazepam, Alprazolam, Clonazepam (reports increased anger on this medication), Trazodone, Concerta.      Review of Systems   Constitutional:  Negative for activity change and unexpected weight change.   HENT:  Negative for hearing loss, rhinorrhea and trouble swallowing.    Eyes:  Negative for discharge and visual disturbance.   Respiratory:  Negative for chest tightness and wheezing.    Cardiovascular:  Negative for chest pain and palpitations.   Gastrointestinal:  Negative for blood in stool, constipation, diarrhea and vomiting.   Endocrine: Negative for polydipsia and polyuria.   Genitourinary:  Negative for difficulty urinating, hematuria and urgency.   Musculoskeletal:  Negative for arthralgias, joint swelling and neck pain.   Neurological:  Negative for weakness and headaches.   Psychiatric/Behavioral:  Positive for dysphoric mood. Negative for confusion.          Objective:     Gen: Well Appearing, NAD, conversant.   HEENT: PERR, EOMI  Chest: Normal work of breathing  Resp: No Conversational Dyspnea  Neuro: A&Ox3,  speech normal  Mood: Mood normal, behavior normal, thought process linear     Assessment:       Problem List Items Addressed This Visit          Psychiatric    Depression - Primary       Plan:       Diagnoses and all orders for this visit:    Depression, unspecified depression type  Trial wellbutrin. Counseled patient on safe and effective use of new medication, including common adverse effects. Patient verbalized understanding.     buPROPion (WELLBUTRIN XL) 150 MG TB24 tablet; Take 1 tablet (150 mg total) by mouth once daily. Can increase to 2 tablets (300 mg total) by mouth once daily after 1 week if tolerating well.           RTC in 1 month      Jordi Piper MD  Family Medicine  [facility]  05/28/2024

## 2024-05-31 DIAGNOSIS — Z72.52 HIGH RISK HOMOSEXUAL BEHAVIOR: Primary | ICD-10-CM

## 2024-05-31 NOTE — TELEPHONE ENCOUNTER
Refill Routing Note   Medication(s) are not appropriate for processing by [facility] for the following reason(s):        Outside of protocol    [facility] action(s):  Route               Appointments  past 12m or future 3m with PCP    Date Provider   Last Visit   5/28/2024 Jordi Piper MD   Next Visit   6/10/2024 Jodri Piper MD   ED visits in past 90 days: 0        Note composed:2:34 PM 05/31/2024

## 2024-06-06 RX ORDER — EMTRICITABINE AND TENOFOVIR DISOPROXIL FUMARATE 200; 300 MG/1; MG/1
1 TABLET, FILM COATED ORAL DAILY
Qty: 90 TABLET | Refills: 0 | Status: ACTIVE | OUTPATIENT
Start: 2024-06-06

## 2024-06-10 ENCOUNTER — PATIENT MESSAGE (OUTPATIENT)
Dept: INTERNAL MEDICINE | Facility: CLINIC | Age: 24
End: 2024-06-10
Payer: COMMERCIAL

## 2024-07-03 ENCOUNTER — OFFICE VISIT (OUTPATIENT)
Dept: INTERNAL MEDICINE | Facility: CLINIC | Age: 24
End: 2024-07-03
Payer: COMMERCIAL

## 2024-07-03 VITALS
SYSTOLIC BLOOD PRESSURE: 125 MMHG | BODY MASS INDEX: 33.46 KG/M2 | DIASTOLIC BLOOD PRESSURE: 75 MMHG | WEIGHT: 233.69 LBS | HEART RATE: 89 BPM | OXYGEN SATURATION: 99 % | HEIGHT: 70 IN

## 2024-07-03 DIAGNOSIS — F32.A DEPRESSION, UNSPECIFIED DEPRESSION TYPE: Primary | ICD-10-CM

## 2024-07-03 DIAGNOSIS — Z79.899 ON PRE-EXPOSURE PROPHYLAXIS FOR HIV: ICD-10-CM

## 2024-07-03 DIAGNOSIS — F41.1 GENERALIZED ANXIETY DISORDER: ICD-10-CM

## 2024-07-03 PROCEDURE — 3008F BODY MASS INDEX DOCD: CPT | Mod: CPTII,S$GLB,, | Performed by: STUDENT IN AN ORGANIZED HEALTH CARE EDUCATION/TRAINING PROGRAM

## 2024-07-03 PROCEDURE — 1159F MED LIST DOCD IN RCRD: CPT | Mod: CPTII,S$GLB,, | Performed by: STUDENT IN AN ORGANIZED HEALTH CARE EDUCATION/TRAINING PROGRAM

## 2024-07-03 PROCEDURE — 1160F RVW MEDS BY RX/DR IN RCRD: CPT | Mod: CPTII,S$GLB,, | Performed by: STUDENT IN AN ORGANIZED HEALTH CARE EDUCATION/TRAINING PROGRAM

## 2024-07-03 PROCEDURE — 99214 OFFICE O/P EST MOD 30 MIN: CPT | Mod: S$GLB,,, | Performed by: STUDENT IN AN ORGANIZED HEALTH CARE EDUCATION/TRAINING PROGRAM

## 2024-07-03 PROCEDURE — 99999 PR PBB SHADOW E&M-EST. PATIENT-LVL III: CPT | Mod: PBBFAC,,, | Performed by: STUDENT IN AN ORGANIZED HEALTH CARE EDUCATION/TRAINING PROGRAM

## 2024-07-03 PROCEDURE — 3078F DIAST BP <80 MM HG: CPT | Mod: CPTII,S$GLB,, | Performed by: STUDENT IN AN ORGANIZED HEALTH CARE EDUCATION/TRAINING PROGRAM

## 2024-07-03 PROCEDURE — 3074F SYST BP LT 130 MM HG: CPT | Mod: CPTII,S$GLB,, | Performed by: STUDENT IN AN ORGANIZED HEALTH CARE EDUCATION/TRAINING PROGRAM

## 2024-07-03 NOTE — PROGRESS NOTES
SUBJECTIVE     Chief Complaint   Patient presents with    Follow-up       HPI  William Carter is a 23 y.o. male with  anxiety, depression, on PrEP for HIV  that presents for follow-up.     Patient is a virtual visit on 05/28/2024 had worsening depression.  Switch to Wellbutrin.  States that he has much better controlled symptoms on Wellbutrin.  He is tolerating medication.  His anxiety is leveled.  He does not get as stressed at work as before.  He also feels less irritable.  He also states that he has fewer cravings for his vape in the morning, which is an added benefit for him.  States that friends have commented that he seems much more calm.  Has not had to use any alprazolam.  No symptoms of psychogenic seizures.    PrEP -  Taking Truvada daily as directed.  Denies missed doses since last appointment.  Completed Hepatitis B vaccination.   Counseled on safer sex practices including condom use and partner testing  Recent STI testing with non-reactive HIV, syphilis.   Denies current signs or symptoms of STI infection.  Denies acute signs or symptoms of HIV infection since last visit.     PAST MEDICAL HISTORY:  Past Medical History:   Diagnosis Date    Concussion     may 2014    Depression     History of psychiatric hospitalization     Panic attacks        PAST SURGICAL HISTORY:  Past Surgical History:   Procedure Laterality Date    ESOPHAGOGASTRODUODENOSCOPY N/A 8/15/2018    Procedure: ESOPHAGOGASTRODUODENOSCOPY (EGD);  Surgeon: Fatmata Lam MD;  Location: [address];  Service: Endoscopy;  Laterality: N/A;       FAMILY HISTORY:  Family History   Problem Relation Name Age of Onset    Hypertension Father      Diabetes Father      Depression Maternal Grandmother          situational    Diabetes Paternal Grandmother      Diabetes Paternal Grandfather      Bipolar disorder Mother      Drug abuse Mother         ALLERGIES AND MEDICATIONS: updated and reviewed.  Review of patient's allergies indicates:  No  "Known Allergies  Current Outpatient Medications   Medication Sig Dispense Refill    buPROPion (WELLBUTRIN XL) 150 MG TB24 tablet Take 1 tablet (150 mg total) by mouth once daily. Can increase to 2 tablets (300 mg total) by mouth once daily after 1 week if tolerating well. 90 tablet 3    emtricitabine-tenofovir 200-300 mg (TRUVADA) 200-300 mg Tab Take 1 tablet by mouth once daily. 90 tablet 0     No current facility-administered medications for this visit.       ROS  Review of Systems   Constitutional:  Negative for activity change, chills and fever.   HENT:  Negative for congestion and hearing loss.    Eyes:  Negative for pain and visual disturbance.   Respiratory:  Negative for cough and shortness of breath.    Cardiovascular:  Negative for chest pain and palpitations.   Gastrointestinal:  Negative for abdominal pain, constipation, diarrhea, nausea and vomiting.   Endocrine: Negative.    Genitourinary: Negative.    Musculoskeletal:  Negative for arthralgias and myalgias.   Skin: Negative.    Allergic/Immunologic: Negative.    Neurological:  Negative for dizziness, light-headedness and headaches.   Hematological: Negative.          OBJECTIVE     Physical Exam  Vitals:    07/03/24 1524   BP: 125/75   Pulse: 89    Body mass index is 33.53 kg/m².  Weight: 106 kg (233 lb 11 oz)   Height: 5' 10" (177.8 cm)     Physical Exam  Vitals reviewed.   Constitutional:       General: He is not in acute distress.     Appearance: Normal appearance.   HENT:      Head: Normocephalic and atraumatic.      Mouth/Throat:      Mouth: Mucous membranes are moist.      Pharynx: Oropharynx is clear.   Eyes:      Extraocular Movements: Extraocular movements intact.      Conjunctiva/sclera: Conjunctivae normal.      Pupils: Pupils are equal, round, and reactive to light.   Cardiovascular:      Rate and Rhythm: Normal rate and regular rhythm.      Pulses: Normal pulses.      Heart sounds: Normal heart sounds.   Pulmonary:      Effort: Pulmonary " effort is normal.      Breath sounds: Normal breath sounds.   Abdominal:      General: There is no distension.   Musculoskeletal:         General: Normal range of motion.      Cervical back: Normal range of motion and neck supple.   Skin:     General: Skin is warm and dry.   Neurological:      General: No focal deficit present.      Mental Status: He is alert.           Health Maintenance         Date Due Completion Date    COVID-19 Vaccine (3 - 2023-24 season) 09/01/2023 6/4/2021    Hemoglobin A1c (Prediabetes) 04/12/2024 4/12/2023    Influenza Vaccine (1) 09/01/2024 10/11/2019    TETANUS VACCINE 03/08/2029 3/8/2019              ASSESSMENT     23 y.o. male with     1. Depression, unspecified depression type    2. Generalized anxiety disorder    3. On pre-exposure prophylaxis for HIV        PLAN:     1. Depression, unspecified depression type  2. Generalized anxiety disorder  - Symptoms controlled on Wellbutrin. Continue.     3. On pre-exposure prophylaxis for HIV  - Stable on Truvada. Continue.   - HIV 1/2 Ag/Ab (4th Gen); Future  - Treponema Pallidium Antibodies IgG, IgM; Future        RTC in 3 months       Jordi Piper MD  Family Medicine  [facility]  07/03/2024    This document was created using voice recognition software (M*Modal Fluency Direct). Although it may be edited, this document may contain errors related to incorrect recognition of the spoken word. Please call the physician if clarification is needed.       Follow up in about 3 months (around 10/3/2024) for Virtual Visit.

## 2024-07-24 ENCOUNTER — HOSPITAL ENCOUNTER (INPATIENT)
Facility: HOSPITAL | Age: 24
LOS: 4 days | Discharge: HOME | DRG: 101 | End: 2024-07-28
Attending: STUDENT IN AN ORGANIZED HEALTH CARE EDUCATION/TRAINING PROGRAM | Admitting: INTERNAL MEDICINE
Payer: COMMERCIAL

## 2024-07-24 DIAGNOSIS — R56.9 SEIZURE-LIKE ACTIVITY: ICD-10-CM

## 2024-07-24 DIAGNOSIS — R07.9 CHEST PAIN: ICD-10-CM

## 2024-07-24 DIAGNOSIS — R56.9 SEIZURE: Primary | ICD-10-CM

## 2024-07-24 DIAGNOSIS — M62.82 NON-TRAUMATIC RHABDOMYOLYSIS: ICD-10-CM

## 2024-07-24 DIAGNOSIS — T43.295A: ICD-10-CM

## 2024-07-24 LAB
ALBUMIN SERPL BCP-MCNC: 4.2 G/DL (ref 3.5–5.2)
ALP SERPL-CCNC: 68 U/L (ref 55–135)
ALT SERPL W/O P-5'-P-CCNC: 41 U/L (ref 10–44)
ANION GAP SERPL CALC-SCNC: 6 MMOL/L (ref 8–16)
AST SERPL-CCNC: 48 U/L (ref 10–40)
BASOPHILS # BLD AUTO: 0.04 K/UL (ref 0–0.2)
BASOPHILS NFR BLD: 0.5 % (ref 0–1.9)
BILIRUB SERPL-MCNC: 0.3 MG/DL (ref 0.1–1)
BILIRUB UR QL STRIP: NEGATIVE
BUN SERPL-MCNC: 8 MG/DL (ref 6–20)
CA-I BLDV-SCNC: 1.17 MMOL/L (ref 1.06–1.42)
CALCIUM SERPL-MCNC: 9.3 MG/DL (ref 8.7–10.5)
CHLORIDE SERPL-SCNC: 107 MMOL/L (ref 95–110)
CK SERPL-CCNC: 2078 U/L (ref 20–200)
CK SERPL-CCNC: 2423 U/L (ref 20–200)
CLARITY UR REFRACT.AUTO: CLEAR
CO2 SERPL-SCNC: 25 MMOL/L (ref 23–29)
COLOR UR AUTO: YELLOW
CREAT SERPL-MCNC: 1 MG/DL (ref 0.5–1.4)
DIFFERENTIAL METHOD BLD: ABNORMAL
EOSINOPHIL # BLD AUTO: 0.1 K/UL (ref 0–0.5)
EOSINOPHIL NFR BLD: 0.7 % (ref 0–8)
ERYTHROCYTE [DISTWIDTH] IN BLOOD BY AUTOMATED COUNT: 12.1 % (ref 11.5–14.5)
EST. GFR  (NO RACE VARIABLE): >60 ML/MIN/1.73 M^2
GLUCOSE SERPL-MCNC: 105 MG/DL (ref 70–110)
GLUCOSE UR QL STRIP: NEGATIVE
HCT VFR BLD AUTO: 44.8 % (ref 40–54)
HGB BLD-MCNC: 15.4 G/DL (ref 14–18)
HGB UR QL STRIP: ABNORMAL
IMM GRANULOCYTES # BLD AUTO: 0.01 K/UL (ref 0–0.04)
IMM GRANULOCYTES NFR BLD AUTO: 0.1 % (ref 0–0.5)
KETONES UR QL STRIP: NEGATIVE
LDH SERPL L TO P-CCNC: 205 U/L (ref 110–260)
LEUKOCYTE ESTERASE UR QL STRIP: NEGATIVE
LYMPHOCYTES # BLD AUTO: 3 K/UL (ref 1–4.8)
LYMPHOCYTES NFR BLD: 35.6 % (ref 18–48)
MAGNESIUM SERPL-MCNC: 1.9 MG/DL (ref 1.6–2.6)
MCH RBC QN AUTO: 32.1 PG (ref 27–31)
MCHC RBC AUTO-ENTMCNC: 34.4 G/DL (ref 32–36)
MCV RBC AUTO: 93 FL (ref 82–98)
MONOCYTES # BLD AUTO: 0.7 K/UL (ref 0.3–1)
MONOCYTES NFR BLD: 8.2 % (ref 4–15)
NEUTROPHILS # BLD AUTO: 4.7 K/UL (ref 1.8–7.7)
NEUTROPHILS NFR BLD: 54.9 % (ref 38–73)
NITRITE UR QL STRIP: NEGATIVE
NRBC BLD-RTO: 0 /100 WBC
PH UR STRIP: 7 [PH] (ref 5–8)
PLATELET # BLD AUTO: 293 K/UL (ref 150–450)
PMV BLD AUTO: 9.5 FL (ref 9.2–12.9)
POCT GLUCOSE: 144 MG/DL (ref 70–110)
POCT GLUCOSE: 146 MG/DL (ref 70–110)
POTASSIUM SERPL-SCNC: 3.5 MMOL/L (ref 3.5–5.1)
PROT SERPL-MCNC: 7.1 G/DL (ref 6–8.4)
PROT UR QL STRIP: NEGATIVE
RBC # BLD AUTO: 4.8 M/UL (ref 4.6–6.2)
SODIUM SERPL-SCNC: 138 MMOL/L (ref 136–145)
SP GR UR STRIP: 1.01 (ref 1–1.03)
URATE SERPL-MCNC: 8.2 MG/DL (ref 3.4–7)
URN SPEC COLLECT METH UR: ABNORMAL
WBC # BLD AUTO: 8.49 K/UL (ref 3.9–12.7)

## 2024-07-24 PROCEDURE — 82085 ASSAY OF ALDOLASE: CPT | Performed by: HOSPITALIST

## 2024-07-24 PROCEDURE — 84550 ASSAY OF BLOOD/URIC ACID: CPT | Performed by: HOSPITALIST

## 2024-07-24 PROCEDURE — 83615 LACTATE (LD) (LDH) ENZYME: CPT | Performed by: HOSPITALIST

## 2024-07-24 PROCEDURE — 99285 EMERGENCY DEPT VISIT HI MDM: CPT | Mod: 25

## 2024-07-24 PROCEDURE — 81003 URINALYSIS AUTO W/O SCOPE: CPT | Performed by: STUDENT IN AN ORGANIZED HEALTH CARE EDUCATION/TRAINING PROGRAM

## 2024-07-24 PROCEDURE — 85025 COMPLETE CBC W/AUTO DIFF WBC: CPT | Performed by: STUDENT IN AN ORGANIZED HEALTH CARE EDUCATION/TRAINING PROGRAM

## 2024-07-24 PROCEDURE — 21400001 HC TELEMETRY ROOM

## 2024-07-24 PROCEDURE — 83874 ASSAY OF MYOGLOBIN: CPT | Performed by: HOSPITALIST

## 2024-07-24 PROCEDURE — 36415 COLL VENOUS BLD VENIPUNCTURE: CPT | Performed by: HOSPITALIST

## 2024-07-24 PROCEDURE — 82550 ASSAY OF CK (CPK): CPT | Mod: 91 | Performed by: HOSPITALIST

## 2024-07-24 PROCEDURE — 82550 ASSAY OF CK (CPK): CPT | Performed by: STUDENT IN AN ORGANIZED HEALTH CARE EDUCATION/TRAINING PROGRAM

## 2024-07-24 PROCEDURE — 63600175 PHARM REV CODE 636 W HCPCS: Performed by: STUDENT IN AN ORGANIZED HEALTH CARE EDUCATION/TRAINING PROGRAM

## 2024-07-24 PROCEDURE — 96360 HYDRATION IV INFUSION INIT: CPT

## 2024-07-24 PROCEDURE — 83735 ASSAY OF MAGNESIUM: CPT | Performed by: STUDENT IN AN ORGANIZED HEALTH CARE EDUCATION/TRAINING PROGRAM

## 2024-07-24 PROCEDURE — 80053 COMPREHEN METABOLIC PANEL: CPT | Performed by: STUDENT IN AN ORGANIZED HEALTH CARE EDUCATION/TRAINING PROGRAM

## 2024-07-24 PROCEDURE — 82330 ASSAY OF CALCIUM: CPT | Performed by: HOSPITALIST

## 2024-07-24 RX ORDER — PROCHLORPERAZINE EDISYLATE 5 MG/ML
5 INJECTION INTRAMUSCULAR; INTRAVENOUS EVERY 6 HOURS PRN
Status: DISCONTINUED | OUTPATIENT
Start: 2024-07-24 | End: 2024-07-28 | Stop reason: HOSPADM

## 2024-07-24 RX ORDER — TALC
6 POWDER (GRAM) TOPICAL NIGHTLY PRN
Status: DISCONTINUED | OUTPATIENT
Start: 2024-07-24 | End: 2024-07-24

## 2024-07-24 RX ORDER — POLYETHYLENE GLYCOL 3350 17 G/17G
17 POWDER, FOR SOLUTION ORAL DAILY
Status: DISCONTINUED | OUTPATIENT
Start: 2024-07-24 | End: 2024-07-28 | Stop reason: HOSPADM

## 2024-07-24 RX ORDER — SODIUM CHLORIDE 0.9 % (FLUSH) 0.9 %
10 SYRINGE (ML) INJECTION EVERY 8 HOURS PRN
Status: DISCONTINUED | OUTPATIENT
Start: 2024-07-24 | End: 2024-07-28 | Stop reason: HOSPADM

## 2024-07-24 RX ORDER — ONDANSETRON 8 MG/1
8 TABLET, ORALLY DISINTEGRATING ORAL EVERY 8 HOURS PRN
Status: DISCONTINUED | OUTPATIENT
Start: 2024-07-24 | End: 2024-07-28 | Stop reason: HOSPADM

## 2024-07-24 RX ORDER — ENOXAPARIN SODIUM 100 MG/ML
40 INJECTION SUBCUTANEOUS EVERY 24 HOURS
Status: DISCONTINUED | OUTPATIENT
Start: 2024-07-24 | End: 2024-07-28 | Stop reason: HOSPADM

## 2024-07-24 RX ORDER — SODIUM CHLORIDE, SODIUM LACTATE, POTASSIUM CHLORIDE, CALCIUM CHLORIDE 600; 310; 30; 20 MG/100ML; MG/100ML; MG/100ML; MG/100ML
INJECTION, SOLUTION INTRAVENOUS CONTINUOUS
Status: DISCONTINUED | OUTPATIENT
Start: 2024-07-24 | End: 2024-07-28 | Stop reason: HOSPADM

## 2024-07-24 RX ORDER — SODIUM CHLORIDE 0.9 % (FLUSH) 0.9 %
10 SYRINGE (ML) INJECTION
Status: DISCONTINUED | OUTPATIENT
Start: 2024-07-24 | End: 2024-07-28 | Stop reason: HOSPADM

## 2024-07-24 RX ORDER — NALOXONE HCL 0.4 MG/ML
0.02 VIAL (ML) INJECTION
Status: DISCONTINUED | OUTPATIENT
Start: 2024-07-24 | End: 2024-07-28 | Stop reason: HOSPADM

## 2024-07-24 RX ORDER — LORAZEPAM 2 MG/ML
2 INJECTION INTRAMUSCULAR
Status: DISCONTINUED | OUTPATIENT
Start: 2024-07-24 | End: 2024-07-28 | Stop reason: HOSPADM

## 2024-07-24 RX ORDER — ACETAMINOPHEN 325 MG/1
650 TABLET ORAL EVERY 6 HOURS PRN
Status: DISCONTINUED | OUTPATIENT
Start: 2024-07-24 | End: 2024-07-28 | Stop reason: HOSPADM

## 2024-07-24 RX ORDER — TALC
6 POWDER (GRAM) TOPICAL NIGHTLY PRN
Status: DISCONTINUED | OUTPATIENT
Start: 2024-07-24 | End: 2024-07-28 | Stop reason: HOSPADM

## 2024-07-24 RX ORDER — IBUPROFEN 200 MG
16 TABLET ORAL
Status: DISCONTINUED | OUTPATIENT
Start: 2024-07-24 | End: 2024-07-28 | Stop reason: HOSPADM

## 2024-07-24 RX ORDER — IPRATROPIUM BROMIDE AND ALBUTEROL SULFATE 2.5; .5 MG/3ML; MG/3ML
3 SOLUTION RESPIRATORY (INHALATION) EVERY 6 HOURS PRN
Status: DISCONTINUED | OUTPATIENT
Start: 2024-07-24 | End: 2024-07-28 | Stop reason: HOSPADM

## 2024-07-24 RX ORDER — IBUPROFEN 200 MG
24 TABLET ORAL
Status: DISCONTINUED | OUTPATIENT
Start: 2024-07-24 | End: 2024-07-28 | Stop reason: HOSPADM

## 2024-07-24 RX ORDER — GLUCAGON 1 MG
1 KIT INJECTION
Status: DISCONTINUED | OUTPATIENT
Start: 2024-07-24 | End: 2024-07-28 | Stop reason: HOSPADM

## 2024-07-24 RX ADMIN — ENOXAPARIN SODIUM 40 MG: 40 INJECTION SUBCUTANEOUS at 06:07

## 2024-07-24 RX ADMIN — SODIUM CHLORIDE, POTASSIUM CHLORIDE, SODIUM LACTATE AND CALCIUM CHLORIDE 1000 ML: 600; 310; 30; 20 INJECTION, SOLUTION INTRAVENOUS at 02:07

## 2024-07-24 RX ADMIN — SODIUM CHLORIDE, POTASSIUM CHLORIDE, SODIUM LACTATE AND CALCIUM CHLORIDE: 600; 310; 30; 20 INJECTION, SOLUTION INTRAVENOUS at 06:07

## 2024-07-24 NOTE — H&P
[facility]  [facility]  History & Physical    Patient Name: William Carter  MRN: [id number]  Patient Class: IP- Inpatient  Admission Date: 7/24/2024  Attending Physician: Prabhjot Garcia MD   Primary Care Provider: Jordi Piper MD         Patient information was obtained from patient and ER records.     Subjective:     Principal Problem:Seizure    Chief Complaint:   Chief Complaint   Patient presents with    Seizures     Pt comes from home for seizure like activity. Pt was told he is in non-traumatic rhabdo.        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 States that he felt light headed and had subsequent episode of vomiting. After trying to participate in exercise once again, he had a syncopal episode. Downtime unknown. He then went to ED, found to have elevated CPK 1.9k. S/p IVF. Symptoms attributed to heat stroke, was subsequently discharged from the ED. 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 Started feeling progressively more lightheaded throughout the day. He then went home to eat lunch with his dad. The last thing he recalls was telling him a story and subsequently passing out. Had reported generalized seizure like activity however duration unknown. Unable to reach father for further info. Unclear when his last seizure-like episode was, believes it was a year ago, previously attributed to pseudoseizures. Endorses that symptoms differ this time around in that he can usually remember events during episodes. This time, he blacked out completely. States that after this episode denies any urinary/bowel incontinence, tongue biting.  States that he remembers being woken up by EMS providers and feeling extremely dazed and confused. Denies further episodes since this AM.    ED course:  Upon arrival, CK level elevated  at 2.4 K. [facility] Contrast. S/p IVF    Past Medical History:   Diagnosis Date    Concussion     may 2014    Depression     History of psychiatric hospitalization     Panic attacks        Past Surgical History:   Procedure Laterality Date    ESOPHAGOGASTRODUODENOSCOPY N/A 8/15/2018    Procedure: ESOPHAGOGASTRODUODENOSCOPY (EGD);  Surgeon: Fatmata Lam MD;  Location: [facility] ([address] [place] [address]);  Service: Endoscopy;  Laterality: N/A;       Review of patient's allergies indicates:  No Known Allergies    No current facility-administered medications on file prior to encounter.     Current Outpatient Medications on File Prior to Encounter   Medication Sig    buPROPion (WELLBUTRIN XL) 150 MG TB24 tablet Take 1 tablet (150 mg total) by mouth once daily. Can increase to 2 tablets (300 mg total) by mouth once daily after 1 week if tolerating well.    emtricitabine-tenofovir 200-300 mg (TRUVADA) 200-300 mg Tab Take 1 tablet by mouth once daily.    ondansetron (ZOFRAN-ODT) 4 MG TbDL Take 1 tablet (4 mg total) by mouth every 8 (eight) hours as needed.     Family History       Problem Relation (Age of Onset)    Bipolar disorder Mother    Depression Maternal Grandmother    Diabetes Father, Paternal Grandmother, Paternal Grandfather    Drug abuse Mother    Hypertension Father          Tobacco Use    Smoking status: Never    Smokeless tobacco: Never    Tobacco comments:     Dad smokes outside   Substance and Sexual Activity    Alcohol use: No    Drug use: No    Sexual activity: Yes     Partners: Male     Birth control/protection: Condom     Comment: 1 partner      Review of Systems   Constitutional:  Negative for activity change, appetite change and chills.   HENT:  Negative for congestion and dental problem.    Eyes:  Negative for discharge and itching.   Respiratory:  Negative for apnea, chest tightness, shortness of breath and wheezing.    Cardiovascular:  Negative for chest pain, palpitations and leg swelling.   Gastrointestinal:   Negative for abdominal distention and abdominal pain.   Endocrine: Negative for cold intolerance and heat intolerance.   Genitourinary:  Negative for difficulty urinating and dysuria.   Musculoskeletal:  Negative for arthralgias and back pain.   Skin:  Negative for color change and pallor.   Allergic/Immunologic: Negative for environmental allergies and food allergies.   Neurological:  Positive for dizziness, seizures, syncope, weakness and headaches. Negative for facial asymmetry.   Hematological:  Negative for adenopathy. Does not bruise/bleed easily.   Psychiatric/Behavioral:  Negative for agitation and behavioral problems.      Objective:     Vital Signs (Most Recent):  Temp: 98 °F (36.7 °C) (07/24/24 1332)  Pulse: 87 (07/24/24 1332)  Resp: 18 (07/24/24 1332)  BP: 136/84 (07/24/24 1332)  SpO2: 95 % (07/24/24 1332) Vital Signs (24h Range):  Temp:  [98 °F (36.7 °C)-98.4 °F (36.9 °C)] 98 °F (36.7 °C)  Pulse:  [87-92] 87  Resp:  [16-18] 18  SpO2:  [95 %-99 %] 95 %  BP: (120-136)/(57-84) 136/84     Weight: 107 kg (236 lb)  Body mass index is 38.09 kg/m².     Physical Exam  Constitutional:       General: He is not in acute distress.     Appearance: Normal appearance. He is not ill-appearing, toxic-appearing or diaphoretic.   HENT:      Head: Normocephalic and atraumatic.      Nose: Nose normal.      Mouth/Throat:      Mouth: Mucous membranes are moist.   Eyes:      Extraocular Movements: Extraocular movements intact.      Conjunctiva/sclera: Conjunctivae normal.      Pupils: Pupils are equal, round, and reactive to light.   Cardiovascular:      Rate and Rhythm: Normal rate and regular rhythm.      Pulses: Normal pulses.      Heart sounds: Normal heart sounds. No murmur heard.     No gallop.   Pulmonary:      Effort: Pulmonary effort is normal. No respiratory distress.      Breath sounds: Normal breath sounds. No wheezing or rales.   Abdominal:      General: Abdomen is flat. Bowel sounds are normal. There is no  distension.      Palpations: Abdomen is soft.      Tenderness: There is no abdominal tenderness. There is no guarding.   Musculoskeletal:         General: No swelling. Normal range of motion.      Cervical back: Normal range of motion and neck supple.   Skin:     General: Skin is warm and dry.      Capillary Refill: Capillary refill takes less than 2 seconds.   Neurological:      General: No focal deficit present.      Mental Status: He is alert and oriented to person, place, and time. Mental status is at baseline.   Psychiatric:         Mood and Affect: Mood normal.         Behavior: Behavior normal.         Thought Content: Thought content normal.         Judgment: Judgment normal.              CRANIAL NERVES     CN III, IV, VI   Pupils are equal, round, and reactive to light.       Significant Labs: All pertinent labs within the past 24 hours have been reviewed.    Significant Imaging: I have reviewed all pertinent imaging results/findings within the past 24 hours.  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  -Neurologically intact, no further episodes since before arrival  -Suspect epileptic seizure given syncopal episode and reported post-ictal state, both of which differ from previous episodes  -CTH unremarkable  Plan:  -Contacted neurology, appreciate recommendations  -Discontinue bupropion  -Seizure precautions      Non-traumatic rhabdomyolysis  -Presents with mild CK elevation 2.4k, increased from 1.9k two days prior  -Likely in the setting of seizure  -Cont IVF resuscitation      Anxiety  D/c bupropion given seizures per above  Consider alternate medication        VTE Risk Mitigation (From admission, onward)           Ordered     enoxaparin injection 40 mg  Daily         07/24/24 6859     IP VTE HIGH RISK PATIENT  Once         07/24/24 5134      Place sequential compression device  Until discontinued         07/24/24 1538     IP VTE HIGH RISK PATIENT  Once         07/24/24 1538     Place sequential compression device  Until discontinued         07/24/24 1538                                    Paolo Parra DO  Department of Hospital Medicine  [facility]

## 2024-07-24 NOTE — ASSESSMENT & PLAN NOTE
-Presents with mild CK elevation 2.4k, increased from 1.9k two days prior  -Likely in the setting of seizure  -Cont IVF resuscitation

## 2024-07-24 NOTE — SUBJECTIVE & OBJECTIVE
Past Medical History:   Diagnosis Date    Concussion     may 2014    Depression     History of psychiatric hospitalization     Panic attacks        Past Surgical History:   Procedure Laterality Date    ESOPHAGOGASTRODUODENOSCOPY N/A 8/15/2018    Procedure: ESOPHAGOGASTRODUODENOSCOPY (EGD);  Surgeon: Fatmata Lam MD;  Location: [address];  Service: Endoscopy;  Laterality: N/A;       Review of patient's allergies indicates:  No Known Allergies    No current facility-administered medications on file prior to encounter.     Current Outpatient Medications on File Prior to Encounter   Medication Sig    buPROPion (WELLBUTRIN XL) 150 MG TB24 tablet Take 1 tablet (150 mg total) by mouth once daily. Can increase to 2 tablets (300 mg total) by mouth once daily after 1 week if tolerating well.    emtricitabine-tenofovir 200-300 mg (TRUVADA) 200-300 mg Tab Take 1 tablet by mouth once daily.    ondansetron (ZOFRAN-ODT) 4 MG TbDL Take 1 tablet (4 mg total) by mouth every 8 (eight) hours as needed.     Family History       Problem Relation (Age of Onset)    Bipolar disorder Mother    Depression Maternal Grandmother    Diabetes Father, Paternal Grandmother, Paternal Grandfather    Drug abuse Mother    Hypertension Father          Tobacco Use    Smoking status: Never    Smokeless tobacco: Never    Tobacco comments:     Dad smokes outside   Substance and Sexual Activity    Alcohol use: No    Drug use: No    Sexual activity: Yes     Partners: Male     Birth control/protection: Condom     Comment: 1 partner      Review of Systems   Constitutional:  Negative for activity change, appetite change and chills.   HENT:  Negative for congestion and dental problem.    Eyes:  Negative for discharge and itching.   Respiratory:  Negative for apnea, chest tightness, shortness of breath and wheezing.    Cardiovascular:  Negative for chest pain, palpitations and leg swelling.   Gastrointestinal:  Negative for abdominal distention and  abdominal pain.   Endocrine: Negative for cold intolerance and heat intolerance.   Genitourinary:  Negative for difficulty urinating and dysuria.   Musculoskeletal:  Negative for arthralgias and back pain.   Skin:  Negative for color change and pallor.   Allergic/Immunologic: Negative for environmental allergies and food allergies.   Neurological:  Positive for dizziness, seizures, syncope, weakness and headaches. Negative for facial asymmetry.   Hematological:  Negative for adenopathy. Does not bruise/bleed easily.   Psychiatric/Behavioral:  Negative for agitation and behavioral problems.      Objective:     Vital Signs (Most Recent):  Temp: 98 °F (36.7 °C) (07/24/24 1332)  Pulse: 87 (07/24/24 1332)  Resp: 18 (07/24/24 1332)  BP: 136/84 (07/24/24 1332)  SpO2: 95 % (07/24/24 1332) Vital Signs (24h Range):  Temp:  [98 °F (36.7 °C)-98.4 °F (36.9 °C)] 98 °F (36.7 °C)  Pulse:  [87-92] 87  Resp:  [16-18] 18  SpO2:  [95 %-99 %] 95 %  BP: (120-136)/(57-84) 136/84     Weight: 107 kg (236 lb)  Body mass index is 38.09 kg/m².     Physical Exam  Constitutional:       General: He is not in acute distress.     Appearance: Normal appearance. He is not ill-appearing, toxic-appearing or diaphoretic.   HENT:      Head: Normocephalic and atraumatic.      Nose: Nose normal.      Mouth/Throat:      Mouth: Mucous membranes are moist.   Eyes:      Extraocular Movements: Extraocular movements intact.      Conjunctiva/sclera: Conjunctivae normal.      Pupils: Pupils are equal, round, and reactive to light.   Cardiovascular:      Rate and Rhythm: Normal rate and regular rhythm.      Pulses: Normal pulses.      Heart sounds: Normal heart sounds. No murmur heard.     No gallop.   Pulmonary:      Effort: Pulmonary effort is normal. No respiratory distress.      Breath sounds: Normal breath sounds. No wheezing or rales.   Abdominal:      General: Abdomen is flat. Bowel sounds are normal. There is no distension.      Palpations: Abdomen is soft.       Tenderness: There is no abdominal tenderness. There is no guarding.   Musculoskeletal:         General: No swelling. Normal range of motion.      Cervical back: Normal range of motion and neck supple.   Skin:     General: Skin is warm and dry.      Capillary Refill: Capillary refill takes less than 2 seconds.   Neurological:      General: No focal deficit present.      Mental Status: He is alert and oriented to person, place, and time. Mental status is at baseline.   Psychiatric:         Mood and Affect: Mood normal.         Behavior: Behavior normal.         Thought Content: Thought content normal.         Judgment: Judgment normal.              CRANIAL NERVES     CN III, IV, VI   Pupils are equal, round, and reactive to light.       Significant Labs: All pertinent labs within the past 24 hours have been reviewed.    Significant Imaging: I have reviewed all pertinent imaging results/findings within the past 24 hours.

## 2024-07-24 NOTE — ASSESSMENT & PLAN NOTE
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

## 2024-07-24 NOTE — ED PROVIDER NOTES
Encounter Date: 7/24/2024       History     Chief Complaint   Patient presents with    Seizures     Pt comes from home for seizure like activity. Pt was told he is in non-traumatic rhabdo.     HPI    24 y/o M PMH depression who presents to the ED for evaluation of a possible seizure.  Patient is in [employer]. Yesterday he had near syncope w/ heat exhaustion. He was seen at an [facility], CK was high; he was given IVF and discharged.  Today he has been having body aches, and was sweaty at times.  He had a witnessed seizure like activity at home by his father at home (just PTA).  No hx of seizures. He does not remember what happened.  Denies any f/c, CP, dyspnea, N/V/D, abd pain, or trauma.      Review of patient's allergies indicates:  No Known Allergies  Past Medical History:   Diagnosis Date    Concussion     may 2014    Depression     History of psychiatric hospitalization     Panic attacks      Past Surgical History:   Procedure Laterality Date    ESOPHAGOGASTRODUODENOSCOPY N/A 8/15/2018    Procedure: ESOPHAGOGASTRODUODENOSCOPY (EGD);  Surgeon: Fatmata Lam MD;  Location: [address];  Service: Endoscopy;  Laterality: N/A;     Family History   Problem Relation Name Age of Onset    Hypertension Father      Diabetes Father      Depression Maternal Grandmother          situational    Diabetes Paternal Grandmother      Diabetes Paternal Grandfather      Bipolar disorder Mother      Drug abuse Mother       Social History     Tobacco Use    Smoking status: Never    Smokeless tobacco: Never    Tobacco comments:     Dad smokes outside   Substance Use Topics    Alcohol use: No    Drug use: No     Review of Systems   Respiratory:  Negative for shortness of breath.    Cardiovascular:  Negative for chest pain.   Neurological:  Positive for seizures.       Physical Exam     Initial Vitals [07/24/24 1308]   BP Pulse Resp Temp SpO2   (!) 120/57 92 16 98.4 °F (36.9 °C) 99 %      MAP       --         Physical  Exam    Nursing note and vitals reviewed.  Constitutional: He appears well-nourished.   HENT:   Head: Atraumatic.   Eyes: Conjunctivae and EOM are normal.   Neck: Neck supple.   No signs of meningismus   Normal range of motion.  Cardiovascular:  Normal rate and regular rhythm.           Pulmonary/Chest: Breath sounds normal. No respiratory distress.   Abdominal: Abdomen is soft. There is no abdominal tenderness.   Musculoskeletal:         General: No edema. Normal range of motion.      Cervical back: Normal range of motion and neck supple.     Neurological: He is alert and oriented to person, place, and time.   5/5 strength UE and LE   Skin: Skin is warm and dry.   Psychiatric: He has a normal mood and affect. Thought content normal.         ED Course   Procedures  Labs Reviewed   CBC W/ AUTO DIFFERENTIAL - Abnormal       Result Value    WBC 8.49      RBC 4.80      Hemoglobin 15.4      Hematocrit 44.8      MCV 93      MCH 32.1 (*)     MCHC 34.4      RDW 12.1      Platelets 293      MPV 9.5      Immature Granulocytes 0.1      Gran # (ANC) 4.7      Immature Grans (Abs) 0.01      Lymph # 3.0      Mono # 0.7      Eos # 0.1      Baso # 0.04      nRBC 0      Gran % 54.9      Lymph % 35.6      Mono % 8.2      Eosinophil % 0.7      Basophil % 0.5      Differential Method Automated     COMPREHENSIVE METABOLIC PANEL - Abnormal    Sodium 138      Potassium 3.5      Chloride 107      CO2 25      Glucose 105      BUN 8      Creatinine 1.0      Calcium 9.3      Total Protein 7.1      Albumin 4.2      Total Bilirubin 0.3      Alkaline Phosphatase 68      AST 48 (*)     ALT 41      eGFR >60.0      Anion Gap 6 (*)    CK - Abnormal    CPK 2423 (*)    URINALYSIS, REFLEX TO URINE CULTURE - Abnormal    Specimen UA Urine, Clean Catch      Color, UA Yellow      Appearance, UA Clear      pH, UA 7.0      Specific Gravity, UA 1.010      Protein, UA Negative      Glucose, UA Negative      Ketones, UA Negative      Bilirubin (UA) Negative       Occult Blood UA Trace (*)     Nitrite, UA Negative      Leukocytes, UA Negative      Narrative:     Specimen Source->Urine   MAGNESIUM    Magnesium 1.9     POCT GLUCOSE, HAND-HELD DEVICE          Imaging Results              CT Head Without Contrast (Final result)  Result time 07/24/24 14:42:20      Final result by Marcel Hernandez MD (07/24/24 14:42:20)                   Impression:      No evidence of acute hemorrhage or major vascular distribution infarct.    Further workup as warranted clinically.    Electronically signed by resident: Rakesh Mendez  Date:    07/24/2024  Time:    14:24    Electronically signed by: Marcel Hernandez MD  Date:    07/24/2024  Time:    14:42               Narrative:    EXAMINATION:  CT HEAD WITHOUT CONTRAST    CLINICAL HISTORY:  Seizure, new-onset, no history of trauma;    TECHNIQUE:  Low dose axial CT images obtained throughout the head without the use of intravenous contrast.  Axial, sagittal and coronal reconstructions were performed.    COMPARISON:  09/21/2018    FINDINGS:  Ventricles and sulci are normal in size for age without evidence of hydrocephalus.    The brain parenchyma appears within normal limits.  No parenchymal  hemorrhage, edema, mass effect or major vascular distribution infarct.    No extra-axial blood or fluid collections.    No displaced calvarial fracture.    The mastoid air cells and visualized paranasal sinuses are essentially clear.                                       Medications   sodium chloride 0.9% flush 10 mL (has no administration in time range)   lactated ringers infusion (has no administration in time range)   sodium chloride 0.9% flush 10 mL (has no administration in time range)   enoxaparin injection 40 mg (has no administration in time range)   albuterol-ipratropium 2.5 mg-0.5 mg/3 mL nebulizer solution 3 mL (has no administration in time range)   melatonin tablet 6 mg (has no administration in time range)   ondansetron disintegrating tablet 8 mg  (has no administration in time range)   prochlorperazine injection Soln 5 mg (has no administration in time range)   polyethylene glycol packet 17 g (has no administration in time range)   acetaminophen tablet 650 mg (has no administration in time range)   naloxone 0.4 mg/mL injection 0.02 mg (has no administration in time range)   glucose chewable tablet 16 g (has no administration in time range)   glucose chewable tablet 24 g (has no administration in time range)   glucagon (human recombinant) injection 1 mg (has no administration in time range)   dextrose 10% bolus 125 mL 125 mL (has no administration in time range)   dextrose 10% bolus 250 mL 250 mL (has no administration in time range)   lactated ringers bolus 1,000 mL (0 mLs Intravenous Stopped 7/24/24 1524)     Medical Decision Making  Amount and/or Complexity of Data Reviewed  Labs: ordered. Decision-making details documented in ED Course.  Radiology: ordered and independent interpretation performed. Decision-making details documented in ED Course.    Risk  Prescription drug management.  Decision regarding hospitalization.                                  22 y/o M here with ?seizure.  VSS in ED, normal exam. Neuro intact.  Likely component of dehydration and increased seizure risk from his bupropion which very easily causes seizures.  Normal lytes.  CTB normal.  IVF given. CK rising.  Will admit for obs and IVF.  Patient agreeable with plan.        Clinical Impression:  Final diagnoses:  [R56.9] Seizure-like activity  [M62.82] Non-traumatic rhabdomyolysis  [T43.295A] Adverse effect of bupropion (Primary)          ED Disposition Condition    Admit Stable                Jluis Gould MD  07/24/24 9248

## 2024-07-24 NOTE — ED TRIAGE NOTES
William Carter, an 23 y.o. male presents to the ED via EMS for seizure like activity. Pt was treated for rhabdo on Monday. Pt states he has no memory of the seizure activity and reports feeling confused before and after. Endorses nausea, constipation, weakness. Denies CP, SOB, V/D, HA, fevers, chills. Hx of depression.       Chief Complaint   Patient presents with    Seizures     Pt comes from home for seizure like activity. Pt was told he is in non-traumatic rhabdo.     Review of patient's allergies indicates:  No Known Allergies  Past Medical History:   Diagnosis Date    Concussion     may 2014    Depression     History of psychiatric hospitalization     Panic attacks

## 2024-07-24 NOTE — PROVIDER PROGRESS NOTES - EMERGENCY DEPT.
Encounter Date: 7/24/2024    ED Physician Progress Notes        Physician Note:   Signed out to me pending admission.  Patient will be admitted for seizure-like activity, rhabdomyolysis with it trending CPK.  Seizure thought to possibly be related to Wellbutrin  HC stable in the emergency department.    No new seizure-like activity in the ED.    Hospital team was signed, admitted.

## 2024-07-25 ENCOUNTER — DOCUMENTATION ONLY (OUTPATIENT)
Dept: NEUROLOGY | Facility: CLINIC | Age: 24
End: 2024-07-25
Payer: COMMERCIAL

## 2024-07-25 PROBLEM — R56.9 SEIZURE-LIKE ACTIVITY: Status: ACTIVE | Noted: 2024-07-25

## 2024-07-25 LAB
ANION GAP SERPL CALC-SCNC: 7 MMOL/L (ref 8–16)
BASOPHILS # BLD AUTO: 0.04 K/UL (ref 0–0.2)
BASOPHILS NFR BLD: 0.6 % (ref 0–1.9)
BUN SERPL-MCNC: 9 MG/DL (ref 6–20)
CALCIUM SERPL-MCNC: 9.8 MG/DL (ref 8.7–10.5)
CHLORIDE SERPL-SCNC: 105 MMOL/L (ref 95–110)
CK SERPL-CCNC: 2303 U/L (ref 20–200)
CO2 SERPL-SCNC: 27 MMOL/L (ref 23–29)
CREAT SERPL-MCNC: 1 MG/DL (ref 0.5–1.4)
DIFFERENTIAL METHOD BLD: ABNORMAL
EOSINOPHIL # BLD AUTO: 0.1 K/UL (ref 0–0.5)
EOSINOPHIL NFR BLD: 1.1 % (ref 0–8)
ERYTHROCYTE [DISTWIDTH] IN BLOOD BY AUTOMATED COUNT: 12.1 % (ref 11.5–14.5)
EST. GFR  (NO RACE VARIABLE): >60 ML/MIN/1.73 M^2
GLUCOSE SERPL-MCNC: 163 MG/DL (ref 70–110)
HCT VFR BLD AUTO: 45.3 % (ref 40–54)
HGB BLD-MCNC: 16.1 G/DL (ref 14–18)
IMM GRANULOCYTES # BLD AUTO: 0.01 K/UL (ref 0–0.04)
IMM GRANULOCYTES NFR BLD AUTO: 0.2 % (ref 0–0.5)
LYMPHOCYTES # BLD AUTO: 2.6 K/UL (ref 1–4.8)
LYMPHOCYTES NFR BLD: 39.6 % (ref 18–48)
MAGNESIUM SERPL-MCNC: 1.9 MG/DL (ref 1.6–2.6)
MCH RBC QN AUTO: 33.6 PG (ref 27–31)
MCHC RBC AUTO-ENTMCNC: 35.5 G/DL (ref 32–36)
MCV RBC AUTO: 95 FL (ref 82–98)
MONOCYTES # BLD AUTO: 0.4 K/UL (ref 0.3–1)
MONOCYTES NFR BLD: 5.9 % (ref 4–15)
NEUTROPHILS # BLD AUTO: 3.5 K/UL (ref 1.8–7.7)
NEUTROPHILS NFR BLD: 52.6 % (ref 38–73)
NRBC BLD-RTO: 0 /100 WBC
PHOSPHATE SERPL-MCNC: 2.7 MG/DL (ref 2.7–4.5)
PLATELET # BLD AUTO: 267 K/UL (ref 150–450)
PMV BLD AUTO: 9.5 FL (ref 9.2–12.9)
POCT GLUCOSE: 108 MG/DL (ref 70–110)
POCT GLUCOSE: 153 MG/DL (ref 70–110)
POCT GLUCOSE: 87 MG/DL (ref 70–110)
POCT GLUCOSE: 92 MG/DL (ref 70–110)
POTASSIUM SERPL-SCNC: 3.5 MMOL/L (ref 3.5–5.1)
RBC # BLD AUTO: 4.79 M/UL (ref 4.6–6.2)
SODIUM SERPL-SCNC: 139 MMOL/L (ref 136–145)
WBC # BLD AUTO: 6.57 K/UL (ref 3.9–12.7)

## 2024-07-25 PROCEDURE — 95714 VEEG EA 12-26 HR UNMNTR: CPT

## 2024-07-25 PROCEDURE — 99223 1ST HOSP IP/OBS HIGH 75: CPT | Mod: ,,, | Performed by: PSYCHIATRY & NEUROLOGY

## 2024-07-25 PROCEDURE — 25000003 PHARM REV CODE 250: Performed by: STUDENT IN AN ORGANIZED HEALTH CARE EDUCATION/TRAINING PROGRAM

## 2024-07-25 PROCEDURE — 84100 ASSAY OF PHOSPHORUS: CPT | Performed by: STUDENT IN AN ORGANIZED HEALTH CARE EDUCATION/TRAINING PROGRAM

## 2024-07-25 PROCEDURE — 63600175 PHARM REV CODE 636 W HCPCS: Performed by: STUDENT IN AN ORGANIZED HEALTH CARE EDUCATION/TRAINING PROGRAM

## 2024-07-25 PROCEDURE — 83735 ASSAY OF MAGNESIUM: CPT | Performed by: STUDENT IN AN ORGANIZED HEALTH CARE EDUCATION/TRAINING PROGRAM

## 2024-07-25 PROCEDURE — 95720 EEG PHY/QHP EA INCR W/VEEG: CPT | Mod: ,,, | Performed by: PSYCHIATRY & NEUROLOGY

## 2024-07-25 PROCEDURE — 80048 BASIC METABOLIC PNL TOTAL CA: CPT | Performed by: STUDENT IN AN ORGANIZED HEALTH CARE EDUCATION/TRAINING PROGRAM

## 2024-07-25 PROCEDURE — 21400001 HC TELEMETRY ROOM

## 2024-07-25 PROCEDURE — 36415 COLL VENOUS BLD VENIPUNCTURE: CPT | Performed by: STUDENT IN AN ORGANIZED HEALTH CARE EDUCATION/TRAINING PROGRAM

## 2024-07-25 PROCEDURE — 82550 ASSAY OF CK (CPK): CPT | Performed by: STUDENT IN AN ORGANIZED HEALTH CARE EDUCATION/TRAINING PROGRAM

## 2024-07-25 PROCEDURE — 85025 COMPLETE CBC W/AUTO DIFF WBC: CPT | Performed by: STUDENT IN AN ORGANIZED HEALTH CARE EDUCATION/TRAINING PROGRAM

## 2024-07-25 PROCEDURE — 95700 EEG CONT REC W/VID EEG TECH: CPT

## 2024-07-25 RX ADMIN — ENOXAPARIN SODIUM 40 MG: 40 INJECTION SUBCUTANEOUS at 06:07

## 2024-07-25 RX ADMIN — ACETAMINOPHEN 650 MG: 325 TABLET ORAL at 10:07

## 2024-07-25 RX ADMIN — SODIUM CHLORIDE, POTASSIUM CHLORIDE, SODIUM LACTATE AND CALCIUM CHLORIDE: 600; 310; 30; 20 INJECTION, SOLUTION INTRAVENOUS at 12:07

## 2024-07-25 RX ADMIN — ACETAMINOPHEN 650 MG: 325 TABLET ORAL at 09:07

## 2024-07-25 RX ADMIN — SODIUM CHLORIDE, POTASSIUM CHLORIDE, SODIUM LACTATE AND CALCIUM CHLORIDE: 600; 310; 30; 20 INJECTION, SOLUTION INTRAVENOUS at 05:07

## 2024-07-25 NOTE — PROGRESS NOTES
EEG Hook up  AM Check Electrodes had to be fixed.Yes    Skin Integrity: Normal   No signs of skin breakdown seen during hookup  Sandy Wheatley   07/25/2024 10:45 AM

## 2024-07-25 NOTE — ASSESSMENT & PLAN NOTE
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

## 2024-07-25 NOTE — HOSPITAL COURSE
Admitted with possible seizure like activity and rhabdomyolysis.  Started on aggressive IVF, titrating as indicated based on CK level/ UOP.  CPK downtrending, encouraged increased p.o. intake on discharge.  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  Repeat CPK in 1 week.  Briefly discussed Truvada with ID as may be contributing to rhabdomyolysis.  Per ID, is associated.  However, given that etiology most likely due to strenuous exercise, okay to monitor outpatient.  Patient counselled on risks and benefit of continuing Truvada, opting to continue.  Neurology consulted, placed on continuous EEG, negative for epileptiform activity.  Psychiatry was then consulted for management of anxiety/possible pseudoseizures.  Started on Lexapro. .

## 2024-07-25 NOTE — PLAN OF CARE
[facility] - Med Surg ([facility]  Initial Discharge Assessment       Primary Care Provider: Jordi Piper MD    Admission Diagnosis: Chest pain [R07.9]  Seizure-like activity [R56.9]  Non-traumatic rhabdomyolysis [M62.82]  Adverse effect of bupropion [T43.295A]    Admission Date: 7/24/2024  Expected Discharge Date: 7/27/2024    Transition of Care Barriers: (P) None    Payor: [place] [employer] / Plan: [facility] CHOICE PLUS / Product Type: Commercial /     Extended Emergency Contact Information  Primary Emergency Contact: Bridgett Figueroa  Address: [address] [place] [address] [place]  Mobile Phone: [phone]  Relation: Friend  Secondary Emergency Contact: Bhavna Casanova  Address: [address] [place] [address] [place]  Mobile Phone: [phone]  Relation: Grandparent    Discharge Plan A: (P) Home  Discharge Plan B: (P) Home      [facility] - [place], [place] - [address]  [address]  [facility] [address]  Phone: [phone] Fax: [phone]    [id number] - [place], [place] - [address]  [address]  [facility] [address]  Phone: [phone] Fax: [phone]    [facility]  [address] [facility] [address]  Phone: [phone] Fax: [phone]      Initial Assessment (most recent)       Adult Discharge Assessment - 07/25/24 1015          Discharge Assessment    Assessment Type Discharge Planning Assessment (P)      Confirmed/corrected address, phone number and insurance Yes (P)      Confirmed Demographics Correct on Facesheet (P)      Source of Information patient (P)      Communicated MYLA with patient/caregiver No (P)      Reason For Admission seizure (P)      People in Home alone (P)      Facility Arrived From: n/a (P)      Do you expect to return to your current living situation? Yes (P)      Do you have help at home or someone to help you manage your care at home? No (P)      Prior to hospitilization cognitive  status: Unable to Assess (P)      Current cognitive status: Alert/Oriented (P)      Walking or Climbing Stairs Difficulty no (P)      Dressing/Bathing Difficulty no (P)      Home Layout Able to live on 1st floor (P)      Equipment Currently Used at Home none (P)      Readmission within 30 days? No (P)      Do you currently have service(s) that help you manage your care at home? No (P)      Do you take prescription medications? Yes (P)      Do you have prescription coverage? Yes (P)      Coverage [employer] (P)      Do you have any problems affording any of your prescribed medications? No (P)      Who is going to help you get home at discharge? [facility] or [facility]eter (P)      How do you get to doctors appointments? car, drives self (P)      Are you on dialysis? No (P)      Do you take coumadin? No (P)      Discharge Plan A Home (P)      Discharge Plan B Home (P)      DME Needed Upon Discharge  none (P)      Discharge Plan discussed with: Patient (P)      Transition of Care Barriers None (P)         Physical Activity    On average, how many days per week do you engage in moderate to strenuous exercise (like a brisk walk)? 5 days (P)      On average, how many minutes do you engage in exercise at this level? 60 min (P)         Financial Resource Strain    How hard is it for you to pay for the very basics like food, housing, medical care, and heating? Not hard at all (P)         Housing Stability    In the last 12 months, was there a time when you were not able to pay the mortgage or rent on time? No (P)      At any time in the past 12 months, were you homeless or living in a shelter (including now)? No (P)         Transportation Needs    Has the lack of transportation kept you from medical appointments, meetings, work or from getting things needed for daily living? No (P)         Food Insecurity    Within the past 12 months, you worried that your food would run out before you got the money to buy more. Never true (P)      Within the  past 12 months, the food you bought just didn't last and you didn't have money to get more. Never true (P)         Stress    Do you feel stress - tense, restless, nervous, or anxious, or unable to sleep at night because your mind is troubled all the time - these days? Not at all (P)         Social Isolation    How often do you feel lonely or isolated from those around you?  Never (P)         Alcohol Use    Q1: How often do you have a drink containing alcohol? Never (P)      Q2: How many drinks containing alcohol do you have on a typical day when you are drinking? Patient does not drink (P)      Q3: How often do you have six or more drinks on one occasion? Never (P)         Utilities    In the past 12 months has the electric, gas, oil, or water company threatened to shut off services in your home? No (P)         Health Literacy    How often do you need to have someone help you when you read instructions, pamphlets, or other written material from your doctor or pharmacy? Rarely (P)                  CM spoke with pt in room.  Pt lives alone in 1 story home, came from home.  [facility] or Dieter will give him a ride home; he drives self to MD appts.  No HH, DME, coumadin, or HD.  Independent with bathing, dressing.    SHERLY Stone, BS, RN, [facility]      Discharge Plan A and Plan B have been determined by review of patient's clinical status, future medical and therapeutic needs, and coverage/benefits for post-acute care in coordination with multidisciplinary team members.

## 2024-07-25 NOTE — ASSESSMENT & PLAN NOTE
D/c bupropion given seizures per above  Previously intolerant of prozac  Consider alternate medication e.g. lexapro

## 2024-07-25 NOTE — PROCEDURES
PRELIM EEG    Patient connected to continuous EEG monitoring. First 20 minutes reviewed.     EEG background shows diffuse theta slowing with up to 8 Hz posteriorly.     No potentially epileptiform activity or seizures seen thus far.     Full EEG report to follow

## 2024-07-25 NOTE — PLAN OF CARE
Problem: Adult Inpatient Plan of Care  Goal: Plan of Care Review  Outcome: Progressing  Flowsheets (Taken 7/25/2024 7704)  Plan of Care Reviewed With: patient  Goal: Patient-Specific Goal (Individualized)  Outcome: Progressing  Goal: Absence of Hospital-Acquired Illness or Injury  Outcome: Progressing  Goal: Optimal Comfort and Wellbeing  Outcome: Progressing  Goal: Readiness for Transition of Care  Outcome: Progressing     Problem: Infection  Goal: Absence of Infection Signs and Symptoms  Outcome: Progressing

## 2024-07-25 NOTE — PROGRESS NOTES
[facility] - Med S[facility] ([address] Medicine  Progress Note    Patient Name: William Carter  MRN: [id number]  Patient Class: IP- Inpatient   Admission Date: 7/24/2024  Length of Stay: 1 days  Attending Physician: Paolo Parra DO  Primary Care Provider: Jordi Piper MD        Subjective:     Principal Problem:Seizure        HPI:  This is a 23-year-old male with a history of anxiety, 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 States that he felt light headed and had subsequent episode of vomiting. After trying to participate in exercise once again, he had a syncopal episode. Downtime unknown. He then went to ED, found to have elevated CPK 1.9k. S/p IVF. Symptoms attributed to heat stroke, was subsequently discharged from the ED. 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 Started feeling progressively more lightheaded throughout the day. He then went home to eat lunch with his dad. The last thing he recalls was telling him a story and subsequently passing out. Had reported generalized seizure like activity however duration unknown. Unable to reach father for further info. Unclear when his last seizure-like episode was, believes it was a year ago, previously attributed to pseudoseizures. Endorses that symptoms differ this time around in that he can usually remember events during episodes. This time, he blacked out completely. States that after this episode denies any urinary/bowel incontinence, tongue biting.  States that he remembers being woken up by EMS providers and feeling extremely dazed and confused. Denies further episodes since this AM.    ED course:  Upon arrival, CK level elevated at 2.4 K. CTH WO Contrast. S/p IVF    Overview/Hospital Course:  Admitted with possible seizure like activity and rhabdomyolysis.  Started on aggressive IVF.  Neurology consulted, placed on continuous  EEG    Interval History: Seen this AM at bedside. Placed on cEEG. No acute events reported overnight. Feels tired although less dazed    Review of Systems  Objective:     Vital Signs (Most Recent):  Temp: 97.9 °F (36.6 °C) (07/25/24 0738)  Pulse: 64 (07/25/24 1136)  Resp: 17 (07/25/24 1136)  BP: (!) 151/88 (07/25/24 1136)  SpO2: (!) 94 % (07/25/24 1136) Vital Signs (24h Range):  Temp:  [97.6 °F (36.4 °C)-98.6 °F (37 °C)] 97.9 °F (36.6 °C)  Pulse:  [61-92] 64  Resp:  [16-18] 17  SpO2:  [93 %-99 %] 94 %  BP: (112-151)/(57-94) 151/88     Weight: 111 kg (244 lb 11.4 oz)  Body mass index is 35.11 kg/m².    Intake/Output Summary (Last 24 hours) at 7/25/2024 1303  Last data filed at 7/24/2024 1524  Gross per 24 hour   Intake 1000 ml   Output --   Net 1000 ml         Physical Exam  Constitutional:       General: He is not in acute distress.     Appearance: Normal appearance. He is not ill-appearing, toxic-appearing or diaphoretic.   HENT:      Head: Normocephalic and atraumatic.      Nose: Nose normal.      Mouth/Throat:      Mouth: Mucous membranes are moist.   Eyes:      Extraocular Movements: Extraocular movements intact.      Conjunctiva/sclera: Conjunctivae normal.      Pupils: Pupils are equal, round, and reactive to light.   Cardiovascular:      Rate and Rhythm: Normal rate and regular rhythm.      Pulses: Normal pulses.      Heart sounds: Normal heart sounds. No murmur heard.     No gallop.   Pulmonary:      Effort: Pulmonary effort is normal. No respiratory distress.      Breath sounds: Normal breath sounds. No wheezing or rales.   Abdominal:      General: Abdomen is flat. Bowel sounds are normal. There is no distension.      Palpations: Abdomen is soft.      Tenderness: There is no abdominal tenderness. There is no guarding.   Musculoskeletal:         General: No swelling. Normal range of motion.      Cervical back: Normal range of motion and neck supple.   Skin:     General: Skin is warm and dry.      Capillary  Refill: Capillary refill takes less than 2 seconds.   Neurological:      General: No focal deficit present.      Mental Status: He is alert and oriented to person, place, and time. Mental status is at baseline.   Psychiatric:         Mood and Affect: Mood normal.         Behavior: Behavior normal.         Thought Content: Thought content normal.         Judgment: Judgment normal.             Significant Labs: All pertinent labs within the past 24 hours have been reviewed.    Significant Imaging: I have reviewed all pertinent imaging results/findings within the past 24 hours.    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  Discussing discontinuation versus medication substitution with ID   -most likely needs outpt monitoring  -Cont IVF resuscitation, increase rate given CPK increase      Anxiety  D/c bupropion given seizures per above  Previously intolerant of prozac  Consider alternate medication e.g. lexapro        VTE Risk Mitigation (From admission, onward)           Ordered     enoxaparin injection 40 mg  Daily         07/24/24 1538     IP VTE HIGH RISK PATIENT  Once         07/24/24 1538     Place sequential compression device  Until discontinued         07/24/24 1538     IP VTE HIGH RISK PATIENT  Once         07/24/24 1538      Place sequential compression device  Until discontinued         07/24/24 1538                    Discharge Planning   MYLA: 7/27/2024     Code Status: Full Code   Is the patient medically ready for discharge?:     Reason for patient still in hospital (select all that apply): Patient trending condition  Discharge Plan A: Home                  Paloo Parra DO  Department of Hospital Medicine   [facility] ([place])

## 2024-07-25 NOTE — SUBJECTIVE & OBJECTIVE
Interval History: Seen this AM at bedside. Placed on cEEG. No acute events reported overnight. Feels tired although less dazed    Review of Systems  Objective:     Vital Signs (Most Recent):  Temp: 97.9 °F (36.6 °C) (07/25/24 0738)  Pulse: 64 (07/25/24 1136)  Resp: 17 (07/25/24 1136)  BP: (!) 151/88 (07/25/24 1136)  SpO2: (!) 94 % (07/25/24 1136) Vital Signs (24h Range):  Temp:  [97.6 °F (36.4 °C)-98.6 °F (37 °C)] 97.9 °F (36.6 °C)  Pulse:  [61-92] 64  Resp:  [16-18] 17  SpO2:  [93 %-99 %] 94 %  BP: (112-151)/(57-94) 151/88     Weight: 111 kg (244 lb 11.4 oz)  Body mass index is 35.11 kg/m².    Intake/Output Summary (Last 24 hours) at 7/25/2024 1303  Last data filed at 7/24/2024 1524  Gross per 24 hour   Intake 1000 ml   Output --   Net 1000 ml         Physical Exam  Constitutional:       General: He is not in acute distress.     Appearance: Normal appearance. He is not ill-appearing, toxic-appearing or diaphoretic.   HENT:      Head: Normocephalic and atraumatic.      Nose: Nose normal.      Mouth/Throat:      Mouth: Mucous membranes are moist.   Eyes:      Extraocular Movements: Extraocular movements intact.      Conjunctiva/sclera: Conjunctivae normal.      Pupils: Pupils are equal, round, and reactive to light.   Cardiovascular:      Rate and Rhythm: Normal rate and regular rhythm.      Pulses: Normal pulses.      Heart sounds: Normal heart sounds. No murmur heard.     No gallop.   Pulmonary:      Effort: Pulmonary effort is normal. No respiratory distress.      Breath sounds: Normal breath sounds. No wheezing or rales.   Abdominal:      General: Abdomen is flat. Bowel sounds are normal. There is no distension.      Palpations: Abdomen is soft.      Tenderness: There is no abdominal tenderness. There is no guarding.   Musculoskeletal:         General: No swelling. Normal range of motion.      Cervical back: Normal range of motion and neck supple.   Skin:     General: Skin is warm and dry.      Capillary Refill:  Capillary refill takes less than 2 seconds.   Neurological:      General: No focal deficit present.      Mental Status: He is alert and oriented to person, place, and time. Mental status is at baseline.   Psychiatric:         Mood and Affect: Mood normal.         Behavior: Behavior normal.         Thought Content: Thought content normal.         Judgment: Judgment normal.             Significant Labs: All pertinent labs within the past 24 hours have been reviewed.    Significant Imaging: I have reviewed all pertinent imaging results/findings within the past 24 hours.

## 2024-07-25 NOTE — ASSESSMENT & PLAN NOTE
-Presents with mild CK elevation 2.4k, increased from 1.9k two days prior  -Likely in the setting of seizure  -on further evaluation, may be attributed to Truvada.  Discussing discontinuation versus medication substitution with ID   -most likely needs outpt monitoring  -Cont IVF resuscitation, increase rate given CPK increase

## 2024-07-25 NOTE — NURSING
Nurses Note -- 4 Eyes      7/24/2024   7:18 PM      Skin assessed during: Admit      [x] No Altered Skin Integrity Present    []Prevention Measures Documented      [] Yes- Altered Skin Integrity Present or Discovered   [] LDA Added if Not in Epic (Describe Wound)   [] New Altered Skin Integrity was Present on Admit and Documented in LDA   [] Wound Image Taken    Wound Care Consulted? No    Attending Nurse:  Lorenza Smith RN/Staff Member:   [place]

## 2024-07-26 LAB
ALDOLASE SERPL-CCNC: 36 U/L (ref 1.2–7.6)
ANION GAP SERPL CALC-SCNC: 6 MMOL/L (ref 8–16)
BASOPHILS # BLD AUTO: 0.05 K/UL (ref 0–0.2)
BASOPHILS NFR BLD: 0.8 % (ref 0–1.9)
BUN SERPL-MCNC: 9 MG/DL (ref 6–20)
CALCIUM SERPL-MCNC: 9.5 MG/DL (ref 8.7–10.5)
CHLORIDE SERPL-SCNC: 110 MMOL/L (ref 95–110)
CK SERPL-CCNC: 3972 U/L (ref 20–200)
CK SERPL-CCNC: 7106 U/L (ref 20–200)
CO2 SERPL-SCNC: 22 MMOL/L (ref 23–29)
CREAT SERPL-MCNC: 0.9 MG/DL (ref 0.5–1.4)
DIFFERENTIAL METHOD BLD: ABNORMAL
EOSINOPHIL # BLD AUTO: 0.1 K/UL (ref 0–0.5)
EOSINOPHIL NFR BLD: 1.6 % (ref 0–8)
ERYTHROCYTE [DISTWIDTH] IN BLOOD BY AUTOMATED COUNT: 11.9 % (ref 11.5–14.5)
EST. GFR  (NO RACE VARIABLE): >60 ML/MIN/1.73 M^2
GLUCOSE SERPL-MCNC: 112 MG/DL (ref 70–110)
GLUCOSE SERPL-MCNC: 128 MG/DL (ref 70–110)
HCT VFR BLD AUTO: 48 % (ref 40–54)
HGB BLD-MCNC: 16 G/DL (ref 14–18)
IMM GRANULOCYTES # BLD AUTO: 0.01 K/UL (ref 0–0.04)
IMM GRANULOCYTES NFR BLD AUTO: 0.2 % (ref 0–0.5)
LYMPHOCYTES # BLD AUTO: 2.7 K/UL (ref 1–4.8)
LYMPHOCYTES NFR BLD: 44 % (ref 18–48)
MAGNESIUM SERPL-MCNC: 1.8 MG/DL (ref 1.6–2.6)
MCH RBC QN AUTO: 32.2 PG (ref 27–31)
MCHC RBC AUTO-ENTMCNC: 33.3 G/DL (ref 32–36)
MCV RBC AUTO: 97 FL (ref 82–98)
MONOCYTES # BLD AUTO: 0.7 K/UL (ref 0.3–1)
MONOCYTES NFR BLD: 11.2 % (ref 4–15)
MYOGLOBIN SERPL-MCNC: 188 MCG/L (ref 0–72)
NEUTROPHILS # BLD AUTO: 2.6 K/UL (ref 1.8–7.7)
NEUTROPHILS NFR BLD: 42.2 % (ref 38–73)
NRBC BLD-RTO: 0 /100 WBC
PHOSPHATE SERPL-MCNC: 3.3 MG/DL (ref 2.7–4.5)
PLATELET # BLD AUTO: 252 K/UL (ref 150–450)
PMV BLD AUTO: 9.3 FL (ref 9.2–12.9)
POCT GLUCOSE: 119 MG/DL (ref 70–110)
POCT GLUCOSE: 128 MG/DL (ref 70–110)
POCT GLUCOSE: 134 MG/DL (ref 70–110)
POCT GLUCOSE: 164 MG/DL (ref 70–110)
POTASSIUM SERPL-SCNC: 4 MMOL/L (ref 3.5–5.1)
RBC # BLD AUTO: 4.97 M/UL (ref 4.6–6.2)
SODIUM SERPL-SCNC: 138 MMOL/L (ref 136–145)
WBC # BLD AUTO: 6.18 K/UL (ref 3.9–12.7)

## 2024-07-26 PROCEDURE — 80048 BASIC METABOLIC PNL TOTAL CA: CPT | Performed by: STUDENT IN AN ORGANIZED HEALTH CARE EDUCATION/TRAINING PROGRAM

## 2024-07-26 PROCEDURE — 82550 ASSAY OF CK (CPK): CPT | Performed by: STUDENT IN AN ORGANIZED HEALTH CARE EDUCATION/TRAINING PROGRAM

## 2024-07-26 PROCEDURE — 36415 COLL VENOUS BLD VENIPUNCTURE: CPT | Performed by: STUDENT IN AN ORGANIZED HEALTH CARE EDUCATION/TRAINING PROGRAM

## 2024-07-26 PROCEDURE — 63600175 PHARM REV CODE 636 W HCPCS: Performed by: STUDENT IN AN ORGANIZED HEALTH CARE EDUCATION/TRAINING PROGRAM

## 2024-07-26 PROCEDURE — 21400001 HC TELEMETRY ROOM

## 2024-07-26 PROCEDURE — 85025 COMPLETE CBC W/AUTO DIFF WBC: CPT | Performed by: STUDENT IN AN ORGANIZED HEALTH CARE EDUCATION/TRAINING PROGRAM

## 2024-07-26 PROCEDURE — 84100 ASSAY OF PHOSPHORUS: CPT | Performed by: STUDENT IN AN ORGANIZED HEALTH CARE EDUCATION/TRAINING PROGRAM

## 2024-07-26 PROCEDURE — 25000003 PHARM REV CODE 250: Performed by: STUDENT IN AN ORGANIZED HEALTH CARE EDUCATION/TRAINING PROGRAM

## 2024-07-26 PROCEDURE — 83735 ASSAY OF MAGNESIUM: CPT | Performed by: STUDENT IN AN ORGANIZED HEALTH CARE EDUCATION/TRAINING PROGRAM

## 2024-07-26 PROCEDURE — 90792 PSYCH DIAG EVAL W/MED SRVCS: CPT | Mod: ,,, | Performed by: PSYCHIATRY & NEUROLOGY

## 2024-07-26 RX ORDER — ESCITALOPRAM OXALATE 5 MG/1
TABLET ORAL
Status: DISPENSED
Start: 2024-07-26 | End: 2024-07-26

## 2024-07-26 RX ORDER — ESCITALOPRAM OXALATE 5 MG/1
5 TABLET ORAL DAILY
Status: DISCONTINUED | OUTPATIENT
Start: 2024-07-26 | End: 2024-07-28 | Stop reason: HOSPADM

## 2024-07-26 RX ORDER — SODIUM CHLORIDE, SODIUM LACTATE, POTASSIUM CHLORIDE, CALCIUM CHLORIDE 600; 310; 30; 20 MG/100ML; MG/100ML; MG/100ML; MG/100ML
INJECTION, SOLUTION INTRAVENOUS CONTINUOUS
Status: CANCELLED | OUTPATIENT
Start: 2024-07-26

## 2024-07-26 RX ADMIN — ESCITALOPRAM OXALATE 5 MG: 5 TABLET, FILM COATED ORAL at 09:07

## 2024-07-26 RX ADMIN — SODIUM CHLORIDE, POTASSIUM CHLORIDE, SODIUM LACTATE AND CALCIUM CHLORIDE: 600; 310; 30; 20 INJECTION, SOLUTION INTRAVENOUS at 03:07

## 2024-07-26 RX ADMIN — POLYETHYLENE GLYCOL 3350 17 G: 17 POWDER, FOR SOLUTION ORAL at 09:07

## 2024-07-26 RX ADMIN — ENOXAPARIN SODIUM 40 MG: 40 INJECTION SUBCUTANEOUS at 05:07

## 2024-07-26 RX ADMIN — ONDANSETRON 8 MG: 8 TABLET, ORALLY DISINTEGRATING ORAL at 03:07

## 2024-07-26 NOTE — ASSESSMENT & PLAN NOTE
Holding bupropion for now given possible seizures per above  Previously intolerant of prozac  Psychiatry:  Continue hold Wellbutrin.  Start Lexapro

## 2024-07-26 NOTE — CONSULTS
Sunday [facility] - [facility] ([place] [id number])  Neurology  Consult Note    Patient Name: William Carter  MRN: [id number]  Admission Date: 7/24/2024  Hospital Length of Stay: 1 days  Code Status: Full Code   Attending Provider: Paolo Parra DO   Consulting Provider: Alan Do MD  Primary Care Physician: Jordi Piper MD  Principal Problem:Seizure    Inpatient consult to Neurology  Consult performed by: Alan Do MD  Consult ordered by: Paolo Parra DO         Subjective:     Chief Complaint:  Seizure like activity     HPI:   Mr. William Carter is a 23 year old male with a past history of nonepileptic seizures that is currently admitted after seizure like activity at home. He is currently enrolled in [employer]. Three days prior to presentation, patient was undergoing strenuous physical activity with very short breaks. During a break, he became lightheaded, nauseous, and vomited x1. He had ringing in his ears and briefly lost consciousness without reported convulsions. He presented to [facility] ED and was found to have a CK of 1.9k. He was given fluids and discharged home with the diagnosis of non-traumatic rhabdomyolysis. He took the next day off and returned to [facility] the day of current presentation. After another strenuous day of physical activity, he returned home for lunch. After standing, he became lightheaded and nauseous once more. His father was present and instructed him to lie down. After lying on the couch, he lost consciousness and father reported generalized limb shaking. His father cannot be contacted to obtain further collateral description.  He reports he regained consciousness, was confused for about 5 minutes, and then presented to [facility] ED. He denies urinary/bowel incontinence and tongue bite. He reports these episodes are different from his prior nonepileptic seizures, in that he had no recollection of current episodes. Of note, patient has been started on wellbutrin for anxiety in the past 2  months. On [place] [facility], patient was found to have a CK of 2.4k which briefly downtrended to 2k before a return to 2.3k after IVF resuscitation. CTH obtained on admission without acute intracranial abnormality. No report of repeated loss of consciousness events after admission.    Concerning patient's history of nonepileptic events, patient was admitted to EMU in 2019 with captured episodes of backwards arching of the neck and back without EEG correlate. He has not been prescribed AEDs since. He denies recent infectious symptoms, illicit substance use, or sleep disturbance.    Neurology is consulted for seizure-like activity.     Past Medical History:   Diagnosis Date    Concussion     may 2014    Depression     History of psychiatric hospitalization     Panic attacks        Past Surgical History:   Procedure Laterality Date    ESOPHAGOGASTRODUODENOSCOPY N/A 8/15/2018    Procedure: ESOPHAGOGASTRODUODENOSCOPY (EGD);  Surgeon: Fatmata Lam MD;  Location: [address];  Service: Endoscopy;  Laterality: N/A;       Review of patient's allergies indicates:  No Known Allergies    No current facility-administered medications on file prior to encounter.     Current Outpatient Medications on File Prior to Encounter   Medication Sig    buPROPion (WELLBUTRIN XL) 150 MG TB24 tablet Take 1 tablet (150 mg total) by mouth once daily. Can increase to 2 tablets (300 mg total) by mouth once daily after 1 week if tolerating well.    emtricitabine-tenofovir 200-300 mg (TRUVADA) 200-300 mg Tab Take 1 tablet by mouth once daily.    ondansetron (ZOFRAN-ODT) 4 MG TbDL Take 1 tablet (4 mg total) by mouth every 8 (eight) hours as needed.     Family History       Problem Relation (Age of Onset)    Bipolar disorder Mother    Depression Maternal Grandmother    Diabetes Father, Paternal Grandmother, Paternal Grandfather    Drug abuse Mother    Hypertension Father          Tobacco Use    Smoking status: Never    Smokeless  tobacco: Never    Tobacco comments:     Dad smokes outside   Substance and Sexual Activity    Alcohol use: No    Drug use: No    Sexual activity: Yes     Partners: Male     Birth control/protection: Condom     Comment: 1 partner      Review of Systems   Constitutional:  Positive for activity change and fatigue. Negative for chills and fever.   HENT:  Negative for sinus pressure, sinus pain, sore throat and trouble swallowing.    Eyes:  Negative for visual disturbance.   Respiratory:  Negative for cough and shortness of breath.    Gastrointestinal:  Negative for diarrhea, nausea and vomiting.   Neurological:  Positive for seizures, syncope and light-headedness. Negative for dizziness, tremors, facial asymmetry, speech difficulty, weakness, numbness and headaches.     Objective:     Vital Signs (Most Recent):  Temp: 97.8 °F (36.6 °C) (07/25/24 1923)  Pulse: 80 (07/25/24 1923)  Resp: 18 (07/25/24 1923)  BP: (!) 137/91 (07/25/24 1923)  SpO2: 95 % (07/25/24 1923) Vital Signs (24h Range):  Temp:  [97.8 °F (36.6 °C)-98.6 °F (37 °C)] 97.8 °F (36.6 °C)  Pulse:  [61-95] 80  Resp:  [17-18] 18  SpO2:  [94 %-97 %] 95 %  BP: (112-151)/(75-94) 137/91     Weight: 111 kg (244 lb 11.4 oz)  Body mass index is 35.11 kg/m².     Physical Exam  Vitals and nursing note reviewed.   Constitutional:       General: He is not in acute distress.     Appearance: Normal appearance. He is not ill-appearing.   HENT:      Head: Normocephalic and atraumatic.      Comments: EEG in place     Mouth/Throat:      Mouth: Mucous membranes are moist.      Pharynx: Oropharynx is clear.   Eyes:      General: No scleral icterus.     Extraocular Movements: Extraocular movements intact and EOM normal.      Conjunctiva/sclera: Conjunctivae normal.      Pupils: Pupils are equal, round, and reactive to light.   Cardiovascular:      Rate and Rhythm: Normal rate and regular rhythm.   Pulmonary:      Effort: Pulmonary effort is normal. No respiratory distress.    Abdominal:      General: Abdomen is flat. There is no distension.   Musculoskeletal:         General: No swelling.   Skin:     General: Skin is warm and dry.   Neurological:      Mental Status: He is alert and oriented to person, place, and time.      Motor: Motor strength is normal.     Coordination: Finger-Nose-Finger Test normal.      Deep Tendon Reflexes:      Reflex Scores:       Tricep reflexes are 2+ on the right side and 2+ on the left side.       Bicep reflexes are 2+ on the right side and 2+ on the left side.       Brachioradialis reflexes are 2+ on the right side and 2+ on the left side.       Patellar reflexes are 2+ on the right side and 2+ on the left side.       Achilles reflexes are 2+ on the right side and 2+ on the left side.  Psychiatric:         Speech: Speech normal.          NEUROLOGICAL EXAMINATION:     MENTAL STATUS   Oriented to person, place, and time.   Follows 3 step commands.   Attention: normal. Concentration: normal.   Speech: speech is normal   Level of consciousness: alert    CRANIAL NERVES     CN II   Visual fields full to confrontation.     CN III, IV, VI   Pupils are equal, round, and reactive to light.  Extraocular motions are normal.   Nystagmus: none   Diplopia: none    CN V   Facial sensation intact.     CN VII   Facial expression full, symmetric.     CN VIII   CN VIII normal.     CN IX, X   Palate: symmetric    CN XI   CN XI normal.     CN XII   CN XII normal.     MOTOR EXAM   Overall muscle tone: normal    Strength   Strength 5/5 throughout.     REFLEXES     Reflexes   Right brachioradialis: 2+  Left brachioradialis: 2+  Right biceps: 2+  Left biceps: 2+  Right triceps: 2+  Left triceps: 2+  Right patellar: 2+  Left patellar: 2+  Right achilles: 2+  Left achilles: 2+  Right plantar: normal  Left plantar: normal  Right Arana: absent  Left Arana: absent    SENSORY EXAM   Light touch normal.     GAIT AND COORDINATION      Coordination   Finger to nose coordination:  normal      Significant Labs: All pertinent lab results from the past 24 hours have been reviewed.    Significant Imaging: I have reviewed all pertinent imaging results/findings within the past 24 hours.  Assessment and Plan:     Seizure-like activity  This is a 23 year old male with a history of nonepileptic events that presents after seizure-like activity without repeated event or neurological deficit. Workup has been revealing of isolated non-traumatic rhabdomyolysis. Given the history of recent strenuous physical activity, we have high suspicion that CK elevation is exertional as opposed to due to seizure activity. Although we are unable to reach his father for collateral history, his described events raise concern for syncopal episodes given his prompt return to baseline, preceding lightheadedness/nausea/tinnitus, and neurological stability at presentation without AEDs. We do not feel that these episodes are consistent with prior nonepileptic events.    Recommendations  --Continue cEEG to monitor for epileptiform discharge or foci  --Agree with discontinuation of wellbutrin to preserve seizure threshold  --Agree with continued IVF for rhabdomyolysis  --Will hold off on starting AEDs until review of EEG given patient's stability  --Continuous telemetry  --Seizure precautions    Neurology will continue to follow. Thank you for your consult. Please reach out with questions or changes in the clinical picture.        VTE Risk Mitigation (From admission, onward)           Ordered     enoxaparin injection 40 mg  Daily         07/24/24 1538     IP VTE HIGH RISK PATIENT  Once         07/24/24 1538     Place sequential compression device  Until discontinued         07/24/24 1538     IP VTE HIGH RISK PATIENT  Once         07/24/24 1538     Place sequential compression device  Until discontinued         07/24/24 1538                    Thank you for your consult. I will follow-up with patient. Please contact us if you have  any additional questions.    Alan Do MD  Neurology  [facility] - [facility] ([facility])

## 2024-07-26 NOTE — HPI
Mr. William Carter is a 23 year old male with a past history of nonepileptic seizures that is currently admitted after seizure like activity at home. He is currently enrolled in [employer]. Three days prior to presentation, patient was undergoing strenuous physical activity with very short breaks. During a break, he became lightheaded, nauseous, and vomited x1. He had ringing in his ears and briefly lost consciousness without reported convulsions. He presented to [facility] ED and was found to have a CK of 1.9k. He was given fluids and discharged home with the diagnosis of non-traumatic rhabdomyolysis. He took the next day off and returned to [employer] the day of current presentation. After another strenuous day of physical activity, he returned home for lunch. After standing, he became lightheaded and nauseous once more. His father was present and instructed him to lie down. After lying on the couch, he lost consciousness and father reported generalized limb shaking. His father cannot be contacted to obtain further collateral description.  He reports he regained consciousness, was confused for about 5 minutes, and then presented to [facility] ED. He denies urinary/bowel incontinence and tongue bite. He reports these episodes are different from his prior nonepileptic seizures, in that he had no recollection of current episodes. Of note, patient has been started on wellbutrin for anxiety in the past 2 months. On presentation to [facility] ED, patient was found to have a CK of 2.4k which briefly downtrended to 2k before a return to 2.3k after IVF resuscitation. CTH obtained on admission without acute intracranial abnormality. No report of repeated loss of consciousness events after admission.    Concerning patient's history of nonepileptic events, patient was admitted to EMU in 2019 with captured episodes of backwards arching of the neck and back without EEG correlate. He has not been prescribed AEDs since. He denies recent infectious  symptoms, illicit substance use, or sleep disturbance.    Neurology is consulted for seizure-like activity.

## 2024-07-26 NOTE — MEDICAL/APP STUDENT
"CONSULTATION LIAISON PSYCHIATRY INITIAL EVALUATION    Patient Name: William Carter  MRN: [id number]  Patient Class: IP- Inpatient  Admission Date: 7/24/2024  Attending Physician: Paolo Parra DO      HPI:   William Carter is a 23 y.o. male with past psychiatric history of anxiety, major depressive disorder, adjustment disorder, and pseudoseizures & no past pertinent medical history presents to the ED/admitted to the hospital for Seizure    Psychiatry consulted for "pseudoseizures."    On psych exam, patient is alert and fully oriented, found lying in bed with EEG leads on. He reports feeling well today, aside from some minor right upper lateral back pain. As is consistent with HPI and neurology notes, patient reports participating in strenuous physical activity at the [employer] on 7/22, when he felt lightheaded, vomited, and had a syncopal episode for which he went to the ED. His CK was 1.9K and was given 2L of IV fluids. At that time, he felt better, cause was thought to be heat exhaustion and he was discharged. He returned to the [employer] on 7/24 and participated in training in the morning, felt lightheaded during the morning, went to eat lunch with his father, and had another syncopal episode. He reports feeling weak and trying to brace himself before passing out. Reports that his father saw him shaking and moving his head side to side during the episode, and called EMS to bring him to the ED.     He states that the level of physical activity he was doing at the [employer] was significantly more than is typical for him and that he felt that he was pushing himself past his limits. He was primarily indoors during the training exercises but does note that he and others noticed that he was sweating profusely prior to onset of symptoms. Patient wonders if Wellbutrin could contribute to hyperhidrosis as well as if Truvada could have contributed to rhabdomyolysis.     He reports that this presentation seems " "different from prior episodes of psychogenic seizures. He states that in the past, when he has a psychogenic seizure, he can hear and that he is aware of his surroundings, but feels that he is not in control of his body. Past episodes were brought on primarily by stress. This time, he felt that he was completely unaware of his surroundings and that he was "totally out" and had no memory of the event.     He has a long history of psychiatric medication trials, including aripiprazole, fluoxetine, venlafaxine, trazodone, and sertraline, most recently being on bupropion. Patient states he had been off of medications "for a while" but was unsure how long, but at the beginning of 2024 he says people noted he was very irritable and "snappy" at work, which he states is not his usual disposition. That prompted him to contact his doctor to discuss reinitiating his medications. His doctor first started him on fluoxetine but he reports having severe nausea and headaches, so he was started on bupropion approximately 2 months ago. He reports feeling well on the bupropion and feels that it is working better than prior medications to manage his anxiety. He is not anxious or feeling depressed currently but does report that lately he has been "very tired" and that he can sleep all day and is sometimes upset when woken up. Denies any suicidal or homicidal ideation. He denies any significant life stressors lately but did just start at the [employer] this past Monday. Works as a .     Reports that he has a good relationship with his family and that his boyfriend has been very supportive.       Collateral with patient's permission:     Per boyfriend in room, patient's last pseudoseizure was months ago, in March.     Medical Review of Systems:  Pertinent items are noted in HPI.    Psychiatric Review of Systems (is patient experiencing or having changes in):  sleep: yes, excessively sleepy lately  appetite: no, though does " report intensely craving coca-cola  weight: no  energy/anergy: yes, lower energy  interest/pleasure/anhedonia: no  somatic symptoms: no  libido: no  anxiety/panic: no  guilty/hopelessness: no  concentration: no  Amisha:no  Psychosis: no  Trauma: no  S.I.B.s/risky behavior: no    Past Psychiatric History:  Previous Medication Trials: yes  Previous Psychiatric Hospitalizations:yes, in September 2014 due to anger issues and homicidal ideation towards stepmother  Previous Suicide Attempts: denies  History of Violence: no  Outpatient Psychiatrist: Has seen Dr. Pratima Daly in past  Family Psychiatric History: yes, mother with bipolar disorder and substance abuse; maternal grandmother with depression; paternal uncle with depression     Substance Abuse History (with emphasis over the last 12 months):  Recreational Drugs:  Denies, gets drug tested at his work  Use of Alcohol: occasional, social use  Tobacco Use:no  Rehab History:no    Social History:  Marital Status: Has long term boyfriend  Children: 0  Employment Status/Info: currently employed  :yes, [employer]  Education: some college  Special Ed: no  Housing Status: alone in trailer  Access to gun: yes, keeps either in car or at bedside  Psychosocial Stressors: denies  Functioning Relationships: good support system, good relationship with spouse or significant other, and good relationship with parents    Legal History:  Past Charges/Incarcerations: no  Pending charges:no    Mental Status Exam:  General Appearance: appears stated age, well developed and nourished, adequately groomed and appropriately dressed, in no acute distress  Behavior: normal; cooperative; reasonably friendly, pleasant, and polite; appropriate eye-contact; under good behavioral control  Involuntary Movements and Motor Activity: no abnormal involuntary movements noted; no tics, no tremors, no akathisia, no dystonia, no evidence of tardive dyskinesia; no psychomotor agitation or  "retardation  Gait and Station: unable to assess - patient lying down or seated  Speech and Language: intact; normal rate, rhythm, volume, tone, and pitch; conversational, spontaneous, and coherent; speaks and understands English proficiently and fluently; repeats words and phrases, no word finding difficulties are noted  Mood: "good"  Affect: normal, euthymic, reactive, full-range, mood-congruent, appropriate to situation and context  Thought Process and Associations: intact; linear, goal-directed, organized, and logical; no loosening of associations noted  Thought Content and Perceptions:: no suicidal or homicidal ideation, no auditory or visual hallucinations, no paranoid ideation, no ideas of reference, no evidence of delusions or psychosis  Sensorium and Orientation: intact; alert with clear sensorium; oriented fully to person, place, time and situation  Recent and Remote Memory: grossly intact, able to recall relevant and salient information from the recent and remote past  Attention and Concentration: grossly intact, attentive to the conversation and not readily distractible  Fund of Knowledge: grossly intact, used appropriate vocabulary and demonstrated an awareness of current events, consistent with educational level achieved  Insight: good  Judgment: good    CAM ICU positive? no      ASSESSMENT & RECOMMENDATIONS   Exertional syncope  Hx of anxiety  Hx of depression  Hx of adjustment disorder  Hx of psychogenic nonepileptic seizures    RECOMMENDATIONS  Discontinue Wellbutrin  Start Escitalopram 5mg PO QD, plan to increase to 10mg PO QD if 5mg well tolerated for next 3-4 days   Continue trending CK, medical workup      OTHER PERTINENT DIAGNOSIS    RISK ASSESSMENT  NO NEED FOR PEC patient NOT in any imminent danger of hurting self or others and not gravely disabled.     FOLLOW UP  Will follow up while in house    DISPOSITION - once medically cleared:   Defer to medical team    Please contact ON CALL psychiatry " service (24/7) for any acute issues that may arise.    Tyson Sullivan, MS3  [employer]  CL Psychiatry  [facility]  7/26/2024 10:14 AM        --------------------------------------------------------------------------------------------------------------------------------------------------------------------------------------------------------------------------------------    CONTINUED HISTORY & OBJECTIVE clinical data & findings reviewed and noted for above decision making    Past Medical/Surgical History:   Past Medical History:   Diagnosis Date    Concussion     may 2014    Depression     History of psychiatric hospitalization     Panic attacks      Past Surgical History:   Procedure Laterality Date    ESOPHAGOGASTRODUODENOSCOPY N/A 8/15/2018    Procedure: ESOPHAGOGASTRODUODENOSCOPY (EGD);  Surgeon: Fatmata Lam MD;  Location: [address];  Service: Endoscopy;  Laterality: N/A;       Current Medications:   Scheduled Meds:    enoxparin  40 mg Subcutaneous Daily    EScitalopram oxalate  5 mg Oral Daily    polyethylene glycol  17 g Oral Daily     PRN Meds:   Current Facility-Administered Medications:     acetaminophen, 650 mg, Oral, Q6H PRN    albuterol-ipratropium, 3 mL, Nebulization, Q6H PRN    dextrose 10%, 12.5 g, Intravenous, PRN    dextrose 10%, 25 g, Intravenous, PRN    glucagon (human recombinant), 1 mg, Intramuscular, PRN    glucose, 16 g, Oral, PRN    glucose, 24 g, Oral, PRN    lorazepam, 2 mg, Intravenous, Q15 Min PRN    melatonin, 6 mg, Oral, Nightly PRN    naloxone, 0.02 mg, Intravenous, PRN    ondansetron, 8 mg, Oral, Q8H PRN    prochlorperazine, 5 mg, Intravenous, Q6H PRN    sodium chloride 0.9%, 10 mL, Intravenous, PRN    sodium chloride 0.9%, 10 mL, Intravenous, Q8H PRN    Allergies:   Review of patient's allergies indicates:  No Known Allergies    Vitals  Vitals:    07/25/24 2331   BP: 133/88   Pulse: 81   Resp: 18   Temp: 98.1 °F (36.7 °C)        Labs/Imaging/Studies:  Recent Results (from the past 24 hour(s))   POCT glucose    Collection Time: 07/25/24 11:34 AM   Result Value Ref Range    POCT Glucose 87 70 - 110 mg/dL   POCT glucose    Collection Time: 07/25/24  3:11 PM   Result Value Ref Range    POCT Glucose 108 70 - 110 mg/dL   POCT glucose    Collection Time: 07/25/24  7:21 PM   Result Value Ref Range    POCT Glucose 153 (H) 70 - 110 mg/dL   Phosphorus    Collection Time: 07/26/24  4:28 AM   Result Value Ref Range    Phosphorus 3.3 2.7 - 4.5 mg/dL   Magnesium    Collection Time: 07/26/24  4:28 AM   Result Value Ref Range    Magnesium 1.8 1.6 - 2.6 mg/dL   Basic Metabolic Panel    Collection Time: 07/26/24  4:28 AM   Result Value Ref Range    Sodium 138 136 - 145 mmol/L    Potassium 4.0 3.5 - 5.1 mmol/L    Chloride 110 95 - 110 mmol/L    CO2 22 (L) 23 - 29 mmol/L    Glucose 112 (H) 70 - 110 mg/dL    BUN 9 6 - 20 mg/dL    Creatinine 0.9 0.5 - 1.4 mg/dL    Calcium 9.5 8.7 - 10.5 mg/dL    Anion Gap 6 (L) 8 - 16 mmol/L    eGFR >60.0 >60 mL/min/1.73 m^2   CBC Auto Differential    Collection Time: 07/26/24  4:28 AM   Result Value Ref Range    WBC 6.18 3.90 - 12.70 K/uL    RBC 4.97 4.60 - 6.20 M/uL    Hemoglobin 16.0 14.0 - 18.0 g/dL    Hematocrit 48.0 40.0 - 54.0 %    MCV 97 82 - 98 fL    MCH 32.2 (H) 27.0 - 31.0 pg    MCHC 33.3 32.0 - 36.0 g/dL    RDW 11.9 11.5 - 14.5 %    Platelets 252 150 - 450 K/uL    MPV 9.3 9.2 - 12.9 fL    Immature Granulocytes 0.2 0.0 - 0.5 %    Gran # (ANC) 2.6 1.8 - 7.7 K/uL    Immature Grans (Abs) 0.01 0.00 - 0.04 K/uL    Lymph # 2.7 1.0 - 4.8 K/uL    Mono # 0.7 0.3 - 1.0 K/uL    Eos # 0.1 0.0 - 0.5 K/uL    Baso # 0.05 0.00 - 0.20 K/uL    nRBC 0 0 /100 WBC    Gran % 42.2 38.0 - 73.0 %    Lymph % 44.0 18.0 - 48.0 %    Mono % 11.2 4.0 - 15.0 %    Eosinophil % 1.6 0.0 - 8.0 %    Basophil % 0.8 0.0 - 1.9 %    Differential Method Automated    CK    Collection Time: 07/26/24  4:28 AM   Result Value Ref Range    CPK 3972 (H) 20 -  200 U/L     Imaging Results              CT Head Without Contrast (Final result)  Result time 07/24/24 14:42:20      Final result by Marcel Hernandez MD (07/24/24 14:42:20)                   Impression:      No evidence of acute hemorrhage or major vascular distribution infarct.    Further workup as warranted clinically.    Electronically signed by resident: Rakesh Mendez  Date:    07/24/2024  Time:    14:24    Electronically signed by: Marcel Hernandez MD  Date:    07/24/2024  Time:    14:42               Narrative:    EXAMINATION:  CT HEAD WITHOUT CONTRAST    CLINICAL HISTORY:  Seizure, new-onset, no history of trauma;    TECHNIQUE:  Low dose axial CT images obtained throughout the head without the use of intravenous contrast.  Axial, sagittal and coronal reconstructions were performed.    COMPARISON:  09/21/2018    FINDINGS:  Ventricles and sulci are normal in size for age without evidence of hydrocephalus.    The brain parenchyma appears within normal limits.  No parenchymal  hemorrhage, edema, mass effect or major vascular distribution infarct.    No extra-axial blood or fluid collections.    No displaced calvarial fracture.    The mastoid air cells and visualized paranasal sinuses are essentially clear.

## 2024-07-26 NOTE — PROGRESS NOTES
[facility] - Med Surg ([address] Medicine  Progress Note    Patient Name: William Carter  MRN: [id number]  Patient Class: IP- Inpatient   Admission Date: 7/24/2024  Length of Stay: 2 days  Attending Physician: Paolo Parra DO  Primary Care Provider: Jordi Piper MD        Subjective:     Principal Problem:Seizure        HPI:  This is a 23-year-old male with a history of anxiety, 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 States that he felt light headed and had subsequent episode of vomiting. After trying to participate in exercise once again, he had a syncopal episode. Downtime unknown. He then went to ED, found to have elevated CPK 1.9k. S/p IVF. Symptoms attributed to heat stroke, was subsequently discharged from the ED. 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 Started feeling progressively more lightheaded throughout the day. He then went home to eat lunch with his dad. The last thing he recalls was telling him a story and subsequently passing out. Had reported generalized seizure like activity however duration unknown. Unable to reach father for further info. Unclear when his last seizure-like episode was, believes it was a year ago, previously attributed to pseudoseizures. Endorses that symptoms differ this time around in that he can usually remember events during episodes. This time, he blacked out completely. States that after this episode denies any urinary/bowel incontinence, tongue biting.  States that he remembers being woken up by EMS providers and feeling extremely dazed and confused. Denies further episodes since this AM.    ED course:  Upon arrival, CK level elevated at 2.4 K. CTH WO Contrast. S/p IVF    Overview/Hospital Course:  Admitted with possible seizure like activity and rhabdomyolysis.  Started on aggressive IVF, titrating as indicated based on CK level/  UOP.  Neurology consulted, placed on continuous EEG, negative for epileptiform activity.  Psychiatry was then consulted for management of anxiety/possible pseudoseizures.    Interval History: Seen this AM at bedside. cEEG completed, negative for seizures. No acute events reported overnight. Feels less dazed    Review of Systems  Objective:     Vital Signs (Most Recent):  Temp: 98.1 °F (36.7 °C) (07/25/24 2331)  Pulse: 96 (07/26/24 1100)  Resp: 18 (07/25/24 2331)  BP: (!) 141/99 (07/26/24 1201)  SpO2: 95 % (07/25/24 2331) Vital Signs (24h Range):  Temp:  [97.8 °F (36.6 °C)-98.1 °F (36.7 °C)] 98.1 °F (36.7 °C)  Pulse:  [57-96] 96  Resp:  [18] 18  SpO2:  [95 %] 95 %  BP: (127-141)/(88-99) 141/99     Weight: 111 kg (244 lb 11.4 oz)  Body mass index is 35.11 kg/m².    Intake/Output Summary (Last 24 hours) at 7/26/2024 1252  Last data filed at 7/26/2024 0754  Gross per 24 hour   Intake --   Output 4250 ml   Net -4250 ml         Physical Exam  Constitutional:       General: He is not in acute distress.     Appearance: Normal appearance. He is not ill-appearing, toxic-appearing or diaphoretic.   HENT:      Head: Normocephalic and atraumatic.      Nose: Nose normal.      Mouth/Throat:      Mouth: Mucous membranes are moist.   Eyes:      Extraocular Movements: Extraocular movements intact.      Conjunctiva/sclera: Conjunctivae normal.      Pupils: Pupils are equal, round, and reactive to light.   Cardiovascular:      Rate and Rhythm: Normal rate and regular rhythm.      Pulses: Normal pulses.      Heart sounds: Normal heart sounds. No murmur heard.     No gallop.   Pulmonary:      Effort: Pulmonary effort is normal. No respiratory distress.      Breath sounds: Normal breath sounds. No wheezing or rales.   Abdominal:      General: Abdomen is flat. Bowel sounds are normal. There is no distension.      Palpations: Abdomen is soft.      Tenderness: There is no abdominal tenderness. There is no guarding.   Musculoskeletal:          General: No swelling. Normal range of motion.      Cervical back: Normal range of motion and neck supple.   Skin:     General: Skin is warm and dry.      Capillary Refill: Capillary refill takes less than 2 seconds.   Neurological:      General: No focal deficit present.      Mental Status: He is alert and oriented to person, place, and time. Mental status is at baseline.   Psychiatric:         Mood and Affect: Mood normal.         Behavior: Behavior normal.         Thought Content: Thought content normal.         Judgment: Judgment normal.             Significant Labs: All pertinent labs within the past 24 hours have been reviewed.    Significant Imaging: I have reviewed all pertinent imaging results/findings within the past 24 hours.    Assessment/Plan:      * Seizure  -Presents after possible grand mal seizure based on description, although unable to reach father (witness) for further history, previous episodes attributed to pseudoseizures  -Neurologically intact, no further episodes since before arrival  -initially suspecting epileptic seizure given syncopal episode and reported post-ictal state, both of which differ from previous episodes  -CTH unremarkable  Plan:  -Contacted neurology, appreciate recommendations   -cEEG placed, negative for seizures  -psychiatry consulted for possible pseudoseizures, appreciate recommendations  -Seizure precautions      Non-traumatic rhabdomyolysis  -Presents with mild CK elevation 2.4k, increased from 1.9k two days prior  -initially thought to be in the setting of seizure however EEG negative.  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  Discussing discontinuation versus medication substitution with ID   -most likely needs outpt monitoring. Will weigh risk v benefit of continuing medication  -Cont IVF resuscitation, increase rate given CPK increase. Repeat level  tonight      Anxiety  Holding bupropion for now given possible seizures per above  Previously intolerant of prozac  Consider alternate medication e.g. lexapro, psychiatry consulted for assistance        VTE Risk Mitigation (From admission, onward)           Ordered     enoxaparin injection 40 mg  Daily         07/24/24 1538     IP VTE HIGH RISK PATIENT  Once         07/24/24 1538     Place sequential compression device  Until discontinued         07/24/24 1538     IP VTE HIGH RISK PATIENT  Once         07/24/24 1538     Place sequential compression device  Until discontinued         07/24/24 1538                    Discharge Planning   MYLA: 7/27/2024     Code Status: Full Code   Is the patient medically ready for discharge?:     Reason for patient still in hospital (select all that apply): Patient trending condition  Discharge Plan A: Home                  Paolo Parra DO  Department of Hospital Medicine   [facility] S[facility] ([place])

## 2024-07-26 NOTE — SUBJECTIVE & OBJECTIVE
Past Medical History:   Diagnosis Date    Concussion     may 2014    Depression     History of psychiatric hospitalization     Panic attacks        Past Surgical History:   Procedure Laterality Date    ESOPHAGOGASTRODUODENOSCOPY N/A 8/15/2018    Procedure: ESOPHAGOGASTRODUODENOSCOPY (EGD);  Surgeon: Fatmata Lam MD;  Location: [address];  Service: Endoscopy;  Laterality: N/A;       Review of patient's allergies indicates:  No Known Allergies    No current facility-administered medications on file prior to encounter.     Current Outpatient Medications on File Prior to Encounter   Medication Sig    buPROPion (WELLBUTRIN XL) 150 MG TB24 tablet Take 1 tablet (150 mg total) by mouth once daily. Can increase to 2 tablets (300 mg total) by mouth once daily after 1 week if tolerating well.    emtricitabine-tenofovir 200-300 mg (TRUVADA) 200-300 mg Tab Take 1 tablet by mouth once daily.    ondansetron (ZOFRAN-ODT) 4 MG TbDL Take 1 tablet (4 mg total) by mouth every 8 (eight) hours as needed.     Family History       Problem Relation (Age of Onset)    Bipolar disorder Mother    Depression Maternal Grandmother    Diabetes Father, Paternal Grandmother, Paternal Grandfather    Drug abuse Mother    Hypertension Father          Tobacco Use    Smoking status: Never    Smokeless tobacco: Never    Tobacco comments:     Dad smokes outside   Substance and Sexual Activity    Alcohol use: No    Drug use: No    Sexual activity: Yes     Partners: Male     Birth control/protection: Condom     Comment: 1 partner      Review of Systems   Constitutional:  Positive for activity change and fatigue. Negative for chills and fever.   HENT:  Negative for sinus pressure, sinus pain, sore throat and trouble swallowing.    Eyes:  Negative for visual disturbance.   Respiratory:  Negative for cough and shortness of breath.    Gastrointestinal:  Negative for diarrhea, nausea and vomiting.   Neurological:  Positive for seizures, syncope and  light-headedness. Negative for dizziness, tremors, facial asymmetry, speech difficulty, weakness, numbness and headaches.     Objective:     Vital Signs (Most Recent):  Temp: 97.8 °F (36.6 °C) (07/25/24 1923)  Pulse: 80 (07/25/24 1923)  Resp: 18 (07/25/24 1923)  BP: (!) 137/91 (07/25/24 1923)  SpO2: 95 % (07/25/24 1923) Vital Signs (24h Range):  Temp:  [97.8 °F (36.6 °C)-98.6 °F (37 °C)] 97.8 °F (36.6 °C)  Pulse:  [61-95] 80  Resp:  [17-18] 18  SpO2:  [94 %-97 %] 95 %  BP: (112-151)/(75-94) 137/91     Weight: 111 kg (244 lb 11.4 oz)  Body mass index is 35.11 kg/m².     Physical Exam  Vitals and nursing note reviewed.   Constitutional:       General: He is not in acute distress.     Appearance: Normal appearance. He is not ill-appearing.   HENT:      Head: Normocephalic and atraumatic.      Comments: EEG in place     Mouth/Throat:      Mouth: Mucous membranes are moist.      Pharynx: Oropharynx is clear.   Eyes:      General: No scleral icterus.     Extraocular Movements: Extraocular movements intact and EOM normal.      Conjunctiva/sclera: Conjunctivae normal.      Pupils: Pupils are equal, round, and reactive to light.   Cardiovascular:      Rate and Rhythm: Normal rate and regular rhythm.   Pulmonary:      Effort: Pulmonary effort is normal. No respiratory distress.   Abdominal:      General: Abdomen is flat. There is no distension.   Musculoskeletal:         General: No swelling.   Skin:     General: Skin is warm and dry.   Neurological:      Mental Status: He is alert and oriented to person, place, and time.      Motor: Motor strength is normal.     Coordination: Finger-Nose-Finger Test normal.      Deep Tendon Reflexes:      Reflex Scores:       Tricep reflexes are 2+ on the right side and 2+ on the left side.       Bicep reflexes are 2+ on the right side and 2+ on the left side.       Brachioradialis reflexes are 2+ on the right side and 2+ on the left side.       Patellar reflexes are 2+ on the right side and  2+ on the left side.       Achilles reflexes are 2+ on the right side and 2+ on the left side.  Psychiatric:         Speech: Speech normal.          NEUROLOGICAL EXAMINATION:     MENTAL STATUS   Oriented to person, place, and time.   Follows 3 step commands.   Attention: normal. Concentration: normal.   Speech: speech is normal   Level of consciousness: alert    CRANIAL NERVES     CN II   Visual fields full to confrontation.     CN III, IV, VI   Pupils are equal, round, and reactive to light.  Extraocular motions are normal.   Nystagmus: none   Diplopia: none    CN V   Facial sensation intact.     CN VII   Facial expression full, symmetric.     CN VIII   CN VIII normal.     CN IX, X   Palate: symmetric    CN XI   CN XI normal.     CN XII   CN XII normal.     MOTOR EXAM   Overall muscle tone: normal    Strength   Strength 5/5 throughout.     REFLEXES     Reflexes   Right brachioradialis: 2+  Left brachioradialis: 2+  Right biceps: 2+  Left biceps: 2+  Right triceps: 2+  Left triceps: 2+  Right patellar: 2+  Left patellar: 2+  Right achilles: 2+  Left achilles: 2+  Right plantar: normal  Left plantar: normal  Right Arana: absent  Left Arana: absent    SENSORY EXAM   Light touch normal.     GAIT AND COORDINATION      Coordination   Finger to nose coordination: normal      Significant Labs: All pertinent lab results from the past 24 hours have been reviewed.    Significant Imaging: I have reviewed all pertinent imaging results/findings within the past 24 hours.

## 2024-07-26 NOTE — PLAN OF CARE
Mr. William Carter is a 23 year old male with a history of nonepileptic events that is admitted after seizure-like activity. He has not exhibited repeated event or neurological deficit after admission. Workup has been revealing of isolated non-traumatic rhabdomyolysis that is uptrending despite IVF resus. Given the history of recent strenuous physical activity, we have high suspicion that CK elevation is exertional vs inflammatory as opposed to seizure activity. Although we are unable to reach his father for collateral history, his described events raise concern for syncopal episodes given his prompt return to baseline, preceding lightheadedness/nausea/tinnitus, and neurological stability at presentation without AEDs. 24hr EEG was unremarkable for epileptiform discharge or focal slowing. We do not feel that these episodes are consistent with prior nonepileptic events.      Recommendations  --Will not recommend initiation of AEDs given unremarkable EEG and high suspicion for syncopal etiology  --DC'd EEG  --Remainder of care per primary team     Neurology will sign off. Thank you for your consult. Please reach out with questions or changes in the clinical picture.

## 2024-07-26 NOTE — CONSULTS
"CONSULTATION LIAISON PSYCHIATRY INITIAL EVALUATION    Patient Name: William Carter  MRN: [id number]  Patient Class: IP- Inpatient  Admission Date: 7/24/2024  Attending Physician: Paolo Parra DO      HPI:   William Carter is a 23 y.o. male with past psychiatric history of anxiety, major depressive disorder, adjustment disorder, and pseudoseizures & no past pertinent medical history presents to the ED/admitted to the hospital for Seizure     Psychiatry consulted for "pseudoseizures."     On psych exam, patient is alert and fully oriented, found lying in bed with EEG leads on. He reports feeling well today, aside from some minor right upper lateral back pain. As is consistent with HPI and neurology notes, patient reports participating in strenuous physical activity at the [employer] on 7/22, when he felt lightheaded, vomited, and had a syncopal episode for which he went to the ED. His was given IV fluids. At that time, he felt better, cause was thought to be heat exhaustion and he was discharged. He returned to the [employer] on 7/24 and participated in training in the morning, felt lightheaded during the morning, went to eat lunch with his father, and had another syncopal episode. He reports feeling weak and trying to brace himself before passing out. Reports that his father saw him shaking and moving his head side to side during the episode, and called EMS to bring him to the ED.      He states that the level of physical activity he was doing at the [employer] was significantly more than is typical for him and that he felt that he was pushing himself past his limits. He was primarily indoors during the training exercises but does note that he and others noticed that he was sweating profusely prior to onset of symptoms.     He reports that this presentation seems different from prior episodes of psychogenic seizures. He states that in the past, when he has a psychogenic seizure, he can hear and that he is aware " "of his surroundings, but feels that he is not in control of his body. Past episodes were brought on primarily by stress. This time, he felt that he was completely unaware of his surroundings and that he was "totally out" and had no memory of the event.      He has a long history of psychiatric medication trials, including aripiprazole, fluoxetine, venlafaxine, trazodone, and sertraline, most recently being on bupropion. Patient states he had been off of medications "for a while" but was unsure how long, but at the beginning of 2024 he says people noted he was very irritable and "snappy" at work, which he states is not his usual disposition. That prompted him to contact his doctor to discuss reinitiating his medications. His doctor first started him on fluoxetine but he reports having severe nausea and headaches, so he was started on bupropion approximately 2 months ago. He reports feeling well on the bupropion and feels that it is working better than prior medications to manage his anxiety. He is not anxious or feeling depressed currently but does report that lately he has been "very tired" and that he can sleep all day and is sometimes upset when woken up. Denies any suicidal or homicidal ideation. Does not report any perceptual disturbances. Denying symptoms of radha. Denying illicit drug use. Denying EtOH consumption or complicated withdrawal from alcohol. He denies any significant life stressors lately but did just start at the [employer] this past Monday. Works as a .      Reports that he has a good relationship with his family and that his boyfriend has been very supportive.         Collateral with patient's permission:      Per boyfriend in room, patient's last pseudoseizure was months ago, in March.      Medical Review of Systems:  Pertinent items are noted in HPI.     Psychiatric Review of Systems (is patient experiencing or having changes in):  sleep: yes, excessively sleepy " lately  appetite: no, though does report intensely craving coca-cola  weight: no  energy/anergy: yes, lower energy  interest/pleasure/anhedonia: no  somatic symptoms: no  libido: no  anxiety/panic: no  guilty/hopelessness: no  concentration: no  Amisha:no  Psychosis: no  Trauma: no  S.I.B.s/risky behavior: no    Past Psychiatric History:  Previous Medication Trials: yes  Previous Psychiatric Hospitalizations:yes, in September 2014 due to anger issues and homicidal ideation towards stepmother  Previous Suicide Attempts: denies  History of Violence: no  Outpatient Psychiatrist: Has seen Dr. Pratima Daly in past  Family Psychiatric History: yes, mother with bipolar disorder and substance abuse; maternal grandmother with depression; paternal uncle with depression      Substance Abuse History (with emphasis over the last 12 months):  Recreational Drugs:  Denies, gets drug tested at his work  Use of Alcohol: occasional, social use  Tobacco Use:no  Rehab History:no     Social History:  Marital Status: Has long term boyfriend  Children: 0  Employment Status/Info: currently employed  :yes, [employer]  Education: some college  Special Ed: no  Housing Status: alone in trailer  Access to gun: yes, keeps either in car or at bedside  Psychosocial Stressors: denies  Functioning Relationships: good support system, good relationship with spouse or significant other, and good relationship with parents    Legal History:  Past Charges/Incarcerations: no  Pending charges:no    Mental Status Exam:  General Appearance: appears stated age, well developed and nourished, adequately groomed and appropriately dressed, in no acute distress  Behavior: normal; cooperative; reasonably friendly, pleasant, and polite; appropriate eye-contact; under good behavioral control  Involuntary Movements and Motor Activity: no abnormal involuntary movements noted; no tics, no tremors, no akathisia, no dystonia, no evidence of tardive dyskinesia; no  "psychomotor agitation or retardation  Gait and Station: unable to assess - patient lying down or seated  Speech and Language: intact; normal rate, rhythm, volume, tone, and pitch; conversational, spontaneous, and coherent; speaks and understands English proficiently and fluently; repeats words and phrases, no word finding difficulties are noted  Mood: "good"  Affect: normal, euthymic, reactive, full-range, mood-congruent, appropriate to situation and context  Thought Process and Associations: intact; linear, goal-directed, organized, and logical; no loosening of associations noted  Thought Content and Perceptions:: no suicidal or homicidal ideation, no auditory or visual hallucinations, no paranoid ideation, no ideas of reference, no evidence of delusions or psychosis  Sensorium and Orientation: intact; alert with clear sensorium; oriented fully to person, place, time and situation  Recent and Remote Memory: grossly intact, able to recall relevant and salient information from the recent and remote past  Attention and Concentration: grossly intact, attentive to the conversation and not readily distractible  Fund of Knowledge: grossly intact, used appropriate vocabulary and demonstrated an awareness of current events, consistent with educational level achieved  Insight: good  Judgment: good    CAM ICU positive? no      ASSESSMENT & RECOMMENDATIONS   Hx of anxiety  Hx of depression  Hx of adjustment disorder  Hx of psychogenic nonepileptic seizures     RECOMMENDATIONS  Discontinue Wellbutrin  Agree with starting Escitalopram 5mg PO QD. Can increase with outpatient provider     OTHER PERTINENT DIAGNOSIS     RISK ASSESSMENT  NO NEED FOR PEC patient NOT in any imminent danger of hurting self or others and not gravely disabled.      FOLLOW UP  Will sign off at this time. Please reconsult with any further questions or concerns.     DISPOSITION - once medically cleared:   Defer to medical team    DISPOSITION - once medically " cleared:   Defer to medical team    Please contact ON CALL psychiatry service (24/7) for any acute issues that may arise.    Tyson Sullivan, MS3  [employer] CL Psychiatry [facility]  7/26/2024 10:14 AM    Original note drafted by MSDavid Sullivan. Edited by me. Note submitted as my independent assessment of the patient.     Dr. Tyrone Lopez  CL Psychiatry [facility]  7/26/2024 11:47 AM        --------------------------------------------------------------------------------------------------------------------------------------------------------------------------------------------------------------------------------------    CONTINUED HISTORY & OBJECTIVE clinical data & findings reviewed and noted for above decision making    Past Medical/Surgical History:   Past Medical History:   Diagnosis Date    Concussion     may 2014    Depression     History of psychiatric hospitalization     Panic attacks      Past Surgical History:   Procedure Laterality Date    ESOPHAGOGASTRODUODENOSCOPY N/A 8/15/2018    Procedure: ESOPHAGOGASTRODUODENOSCOPY (EGD);  Surgeon: Fatmata Lam MD;  Location: [address];  Service: Endoscopy;  Laterality: N/A;       Current Medications:   Scheduled Meds:    enoxparin  40 mg Subcutaneous Daily    EScitalopram oxalate        EScitalopram oxalate  5 mg Oral Daily    polyethylene glycol  17 g Oral Daily     PRN Meds:   Current Facility-Administered Medications:     acetaminophen, 650 mg, Oral, Q6H PRN    albuterol-ipratropium, 3 mL, Nebulization, Q6H PRN    dextrose 10%, 12.5 g, Intravenous, PRN    dextrose 10%, 25 g, Intravenous, PRN    EScitalopram oxalate, , ,     glucagon (human recombinant), 1 mg, Intramuscular, PRN    glucose, 16 g, Oral, PRN    glucose, 24 g, Oral, PRN    lorazepam, 2 mg, Intravenous, Q15 Min PRN    melatonin, 6 mg, Oral, Nightly PRN    naloxone, 0.02 mg, Intravenous, PRN    ondansetron, 8 mg, Oral, Q8H PRN     prochlorperazine, 5 mg, Intravenous, Q6H PRN    sodium chloride 0.9%, 10 mL, Intravenous, PRN    sodium chloride 0.9%, 10 mL, Intravenous, Q8H PRN    Allergies:   Review of patient's allergies indicates:  No Known Allergies    Vitals  Vitals:    07/26/24 1100   BP:    Pulse: 96   Resp:    Temp:        Labs/Imaging/Studies:  Recent Results (from the past 24 hour(s))   POCT glucose    Collection Time: 07/25/24  3:11 PM   Result Value Ref Range    POCT Glucose 108 70 - 110 mg/dL   POCT glucose    Collection Time: 07/25/24  7:21 PM   Result Value Ref Range    POCT Glucose 153 (H) 70 - 110 mg/dL   Phosphorus    Collection Time: 07/26/24  4:28 AM   Result Value Ref Range    Phosphorus 3.3 2.7 - 4.5 mg/dL   Magnesium    Collection Time: 07/26/24  4:28 AM   Result Value Ref Range    Magnesium 1.8 1.6 - 2.6 mg/dL   Basic Metabolic Panel    Collection Time: 07/26/24  4:28 AM   Result Value Ref Range    Sodium 138 136 - 145 mmol/L    Potassium 4.0 3.5 - 5.1 mmol/L    Chloride 110 95 - 110 mmol/L    CO2 22 (L) 23 - 29 mmol/L    Glucose 112 (H) 70 - 110 mg/dL    BUN 9 6 - 20 mg/dL    Creatinine 0.9 0.5 - 1.4 mg/dL    Calcium 9.5 8.7 - 10.5 mg/dL    Anion Gap 6 (L) 8 - 16 mmol/L    eGFR >60.0 >60 mL/min/1.73 m^2   CBC Auto Differential    Collection Time: 07/26/24  4:28 AM   Result Value Ref Range    WBC 6.18 3.90 - 12.70 K/uL    RBC 4.97 4.60 - 6.20 M/uL    Hemoglobin 16.0 14.0 - 18.0 g/dL    Hematocrit 48.0 40.0 - 54.0 %    MCV 97 82 - 98 fL    MCH 32.2 (H) 27.0 - 31.0 pg    MCHC 33.3 32.0 - 36.0 g/dL    RDW 11.9 11.5 - 14.5 %    Platelets 252 150 - 450 K/uL    MPV 9.3 9.2 - 12.9 fL    Immature Granulocytes 0.2 0.0 - 0.5 %    Gran # (ANC) 2.6 1.8 - 7.7 K/uL    Immature Grans (Abs) 0.01 0.00 - 0.04 K/uL    Lymph # 2.7 1.0 - 4.8 K/uL    Mono # 0.7 0.3 - 1.0 K/uL    Eos # 0.1 0.0 - 0.5 K/uL    Baso # 0.05 0.00 - 0.20 K/uL    nRBC 0 0 /100 WBC    Gran % 42.2 38.0 - 73.0 %    Lymph % 44.0 18.0 - 48.0 %    Mono % 11.2 4.0 - 15.0 %     Eosinophil % 1.6 0.0 - 8.0 %    Basophil % 0.8 0.0 - 1.9 %    Differential Method Automated    CK    Collection Time: 07/26/24  4:28 AM   Result Value Ref Range    CPK 3972 (H) 20 - 200 U/L   POCT glucose    Collection Time: 07/26/24  8:19 AM   Result Value Ref Range    POCT Glucose 164 (H) 70 - 110 mg/dL     Imaging Results              CT Head Without Contrast (Final result)  Result time 07/24/24 14:42:20      Final result by Marcel Hernandez MD (07/24/24 14:42:20)                   Impression:      No evidence of acute hemorrhage or major vascular distribution infarct.    Further workup as warranted clinically.    Electronically signed by resident: Rakesh Mendez  Date:    07/24/2024  Time:    14:24    Electronically signed by: Marcel Hernandez MD  Date:    07/24/2024  Time:    14:42               Narrative:    EXAMINATION:  CT HEAD WITHOUT CONTRAST    CLINICAL HISTORY:  Seizure, new-onset, no history of trauma;    TECHNIQUE:  Low dose axial CT images obtained throughout the head without the use of intravenous contrast.  Axial, sagittal and coronal reconstructions were performed.    COMPARISON:  09/21/2018    FINDINGS:  Ventricles and sulci are normal in size for age without evidence of hydrocephalus.    The brain parenchyma appears within normal limits.  No parenchymal  hemorrhage, edema, mass effect or major vascular distribution infarct.    No extra-axial blood or fluid collections.    No displaced calvarial fracture.    The mastoid air cells and visualized paranasal sinuses are essentially clear.

## 2024-07-26 NOTE — ASSESSMENT & PLAN NOTE
-Presents after possible grand mal seizure based on description, although unable to reach father (witness) for further history, previous episodes attributed to pseudoseizures  -Neurologically intact, no further episodes since before arrival  -initially suspecting epileptic seizure given syncopal episode and reported post-ictal state, both of which differ from previous episodes  -CTH unremarkable  Plan:  -Contacted neurology, appreciate recommendations   -cEEG placed, negative for seizures  -psychiatry consulted for possible pseudoseizures, appreciate recommendations  -Seizure precautions

## 2024-07-26 NOTE — PROCEDURES
Date of service  7/25/24-7/26/24    Introduction  Electroencephalographic (EEG) is recorded with electrodes placed according to the International 10-20 placement system.  Thirty two (32) channels  of digital signal (sampling rate of 512/sec), including T1 and T2, were simultaneously recorded from the scalp and may include EKG, EMG, and/or eye monitors.  Recording band pass was 0.1 to 512 Hz.  Digital video recording of the patient is simultaneously recorded with the EEG.  The patient is instructed to report clinical symptoms which may occur during the recording session.  EEG and video recording are stored and archived in digital format.  Activation procedures, which include photic stimulation, hyperventilation and instructing patients to perform simple tasks, are done in selected patients.    The EEG is displayed on a monitor screen and can be reviewed using different montages.  Computer-assisted analysis is employed to detect spike and electrographic seizure activity.   The entire record is submitted for computer analysis.  The entire recording is visually reviewed, and the times identified by computer analysis as being spikes or seizures are reviewed again.      Compressed spectral analysis (CSA) is also performed on the activity recorded from each individual channel.  This is displayed as a power display of frequencies from 0 to 30 Hz over time.   The CSA is reviewed looking for asymmetries in power between homologous areas of the scalp, then compared with the original EEG recording.      [employer] software was also utilized in the review of this study. This software suite analyzes the EEG recording in multiple domains. Coherence and rhythmicity are computed to identify EEG sections which may contain organized seizures. Each channel undergoes analysis to detect the presence of spike and sharp waves which have special and morphological characteristics of epileptic activity. The routine EEG recording is converted  from special into frequency domain. This is then displayed comparing homologous areas to identify areas of significant asymmetry. Algorithm to identify non-cortically generated artifact is used to separate artifact from the EEG.    Recording Times  7/25/24 10:14 - 7/26/24 07:00 (20:37)  7/26/24 07:01-10:07 (3:05)  A total of 23 hours and 42 minutes of EEG/video telemetry was recorded.    Findings  The patient's background consists of a posteriorly dominant 9 Hz alpha rhythm, which is well-formed and abolishes with eye opening.  There is mild diffuse 6-7 Hz theta slowing.     During the course of the recording, the patient is noted to be awake, and subsequently becomes drowsy.  Ultimately, stage II, III, and REM sleep is appreciated.    Hyperventilation and photic stimulation were not performed.     There is no focal slowing.  There are no focal, lateralized, or epileptiform transients.  No electrographic seizures are seen.    No clinical events of concern were noted.     EKG demonstrates regular rhythm.    Interpretation  This LTM-EEG study shows mild diffuse nonspecific slowing of the background.  This recording provides no evidence of epilepsy.  Please be aware that an EEG monitoring study that does not capture the events of interest cannot fully exclude the possibility of an underlying seizure disorder.

## 2024-07-26 NOTE — ASSESSMENT & PLAN NOTE
-Presents with mild CK elevation 2.4k, increased from 1.9k two days prior  -initially thought to be in the setting of seizure however EEG negative.  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  Discussing discontinuation versus medication substitution with ID   -most likely needs outpt monitoring. Will weigh risk v benefit of continuing medication  -Cont IVF resuscitation, increase rate given CPK increase. Repeat level tonight

## 2024-07-26 NOTE — ASSESSMENT & PLAN NOTE
This is a 23 year old male with a history of nonepileptic events that presents after seizure-like activity without repeated event or neurological deficit. Workup has been revealing of isolated non-traumatic rhabdomyolysis. Given the history of recent strenuous physical activity, we have high suspicion that CK elevation is exertional as opposed to due to seizure activity. Although we are unable to reach his father for collateral history, his described events raise concern for syncopal episodes given his prompt return to baseline, preceding lightheadedness/nausea/tinnitus, and neurological stability at presentation without AEDs. We do not feel that these episodes are consistent with prior nonepileptic events.    Recommendations  --Continue cEEG to monitor for epileptiform discharge or foci  --Agree with discontinuation of wellbutrin to preserve seizure threshold  --Agree with continued IVF for rhabdomyolysis  --Will hold off on starting AEDs until review of EEG given patient's stability  --Continuous telemetry  --Seizure precautions    Neurology will continue to follow. Thank you for your consult. Please reach out with questions or changes in the clinical picture.

## 2024-07-27 LAB
ANION GAP SERPL CALC-SCNC: 8 MMOL/L (ref 8–16)
BASOPHILS # BLD AUTO: 0.04 K/UL (ref 0–0.2)
BASOPHILS NFR BLD: 0.6 % (ref 0–1.9)
BUN SERPL-MCNC: 10 MG/DL (ref 6–20)
CALCIUM SERPL-MCNC: 9.4 MG/DL (ref 8.7–10.5)
CHLORIDE SERPL-SCNC: 106 MMOL/L (ref 95–110)
CK SERPL-CCNC: 5228 U/L (ref 20–200)
CK SERPL-CCNC: 6608 U/L (ref 20–200)
CO2 SERPL-SCNC: 27 MMOL/L (ref 23–29)
CREAT SERPL-MCNC: 0.9 MG/DL (ref 0.5–1.4)
DIFFERENTIAL METHOD BLD: ABNORMAL
EOSINOPHIL # BLD AUTO: 0.1 K/UL (ref 0–0.5)
EOSINOPHIL NFR BLD: 1.2 % (ref 0–8)
ERYTHROCYTE [DISTWIDTH] IN BLOOD BY AUTOMATED COUNT: 12 % (ref 11.5–14.5)
EST. GFR  (NO RACE VARIABLE): >60 ML/MIN/1.73 M^2
GLUCOSE SERPL-MCNC: 106 MG/DL (ref 70–110)
HCT VFR BLD AUTO: 45.9 % (ref 40–54)
HGB BLD-MCNC: 15.5 G/DL (ref 14–18)
IMM GRANULOCYTES # BLD AUTO: 0.01 K/UL (ref 0–0.04)
IMM GRANULOCYTES NFR BLD AUTO: 0.1 % (ref 0–0.5)
LYMPHOCYTES # BLD AUTO: 2.5 K/UL (ref 1–4.8)
LYMPHOCYTES NFR BLD: 36.9 % (ref 18–48)
MAGNESIUM SERPL-MCNC: 1.9 MG/DL (ref 1.6–2.6)
MCH RBC QN AUTO: 31.9 PG (ref 27–31)
MCHC RBC AUTO-ENTMCNC: 33.8 G/DL (ref 32–36)
MCV RBC AUTO: 94 FL (ref 82–98)
MONOCYTES # BLD AUTO: 0.7 K/UL (ref 0.3–1)
MONOCYTES NFR BLD: 10.2 % (ref 4–15)
NEUTROPHILS # BLD AUTO: 3.5 K/UL (ref 1.8–7.7)
NEUTROPHILS NFR BLD: 51 % (ref 38–73)
NRBC BLD-RTO: 0 /100 WBC
PHOSPHATE SERPL-MCNC: 3.3 MG/DL (ref 2.7–4.5)
PLATELET # BLD AUTO: 258 K/UL (ref 150–450)
PMV BLD AUTO: 9.6 FL (ref 9.2–12.9)
POCT GLUCOSE: 123 MG/DL (ref 70–110)
POCT GLUCOSE: 124 MG/DL (ref 70–110)
POCT GLUCOSE: 97 MG/DL (ref 70–110)
POTASSIUM SERPL-SCNC: 3.8 MMOL/L (ref 3.5–5.1)
RBC # BLD AUTO: 4.86 M/UL (ref 4.6–6.2)
SODIUM SERPL-SCNC: 141 MMOL/L (ref 136–145)
WBC # BLD AUTO: 6.78 K/UL (ref 3.9–12.7)

## 2024-07-27 PROCEDURE — 25000003 PHARM REV CODE 250: Performed by: STUDENT IN AN ORGANIZED HEALTH CARE EDUCATION/TRAINING PROGRAM

## 2024-07-27 PROCEDURE — 80048 BASIC METABOLIC PNL TOTAL CA: CPT | Performed by: STUDENT IN AN ORGANIZED HEALTH CARE EDUCATION/TRAINING PROGRAM

## 2024-07-27 PROCEDURE — 83735 ASSAY OF MAGNESIUM: CPT | Performed by: STUDENT IN AN ORGANIZED HEALTH CARE EDUCATION/TRAINING PROGRAM

## 2024-07-27 PROCEDURE — 85025 COMPLETE CBC W/AUTO DIFF WBC: CPT | Performed by: STUDENT IN AN ORGANIZED HEALTH CARE EDUCATION/TRAINING PROGRAM

## 2024-07-27 PROCEDURE — 84100 ASSAY OF PHOSPHORUS: CPT | Performed by: STUDENT IN AN ORGANIZED HEALTH CARE EDUCATION/TRAINING PROGRAM

## 2024-07-27 PROCEDURE — 63600175 PHARM REV CODE 636 W HCPCS: Performed by: STUDENT IN AN ORGANIZED HEALTH CARE EDUCATION/TRAINING PROGRAM

## 2024-07-27 PROCEDURE — 82550 ASSAY OF CK (CPK): CPT | Performed by: STUDENT IN AN ORGANIZED HEALTH CARE EDUCATION/TRAINING PROGRAM

## 2024-07-27 PROCEDURE — 36415 COLL VENOUS BLD VENIPUNCTURE: CPT | Performed by: STUDENT IN AN ORGANIZED HEALTH CARE EDUCATION/TRAINING PROGRAM

## 2024-07-27 PROCEDURE — 21400001 HC TELEMETRY ROOM

## 2024-07-27 RX ADMIN — ENOXAPARIN SODIUM 40 MG: 40 INJECTION SUBCUTANEOUS at 05:07

## 2024-07-27 RX ADMIN — ESCITALOPRAM OXALATE 5 MG: 5 TABLET, FILM COATED ORAL at 09:07

## 2024-07-27 RX ADMIN — ACETAMINOPHEN 650 MG: 325 TABLET ORAL at 08:07

## 2024-07-27 RX ADMIN — ONDANSETRON 8 MG: 8 TABLET, ORALLY DISINTEGRATING ORAL at 08:07

## 2024-07-27 NOTE — PROGRESS NOTES
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 States that he felt light headed and had subsequent episode of vomiting. After trying to participate in exercise once again, he had a syncopal episode. Downtime unknown. He then went to ED, found to have elevated CPK 1.9k. S/p IVF. Symptoms attributed to heat stroke, was subsequently discharged from the ED. 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 Started feeling progressively more lightheaded throughout the day. He then went home to eat lunch with his dad. The last thing he recalls was telling him a story and subsequently passing out. Had reported generalized seizure like activity however duration unknown. Unable to reach father for further info. Unclear when his last seizure-like episode was, believes it was a year ago, previously attributed to pseudoseizures. Endorses that symptoms differ this time around in that he can usually remember events during episodes. This time, he blacked out completely. States that after this episode denies any urinary/bowel incontinence, tongue biting.  States that he remembers being woken up by EMS providers and feeling extremely dazed and confused. Denies further episodes since this AM.    ED course:  Upon arrival, CK level elevated at 2.4 K. CTH WO Contrast. S/p IVF    Overview/Hospital Course:  Admitted with possible seizure like activity and rhabdomyolysis.  Started on aggressive IVF, titrating as indicated based on CK level/  UOP.  Neurology consulted, placed on continuous EEG, negative for epileptiform activity.  Psychiatry was then consulted for management of anxiety/possible pseudoseizures.  Started on Lexapro    Interval History: Seen this AM at bedside. No acute events reported overnight. Feeling better.  Family present at bedside    Review of Systems  Objective:     Vital Signs (Most Recent):  Temp: 98.1 °F (36.7 °C) (07/25/24 2331)  Pulse: 96 (07/26/24 1100)  Resp: 18 (07/25/24 2331)  BP: (!) 141/99 (07/26/24 1201)  SpO2: 95 % (07/25/24 2331) Vital Signs (24h Range):  Temp:  [97.8 °F (36.6 °C)-98.1 °F (36.7 °C)] 98.1 °F (36.7 °C)  Pulse:  [57-96] 96  Resp:  [18] 18  SpO2:  [95 %] 95 %  BP: (127-141)/(88-99) 141/99     Weight: 111 kg (244 lb 11.4 oz)  Body mass index is 35.11 kg/m².    Intake/Output Summary (Last 24 hours) at 7/26/2024 1252  Last data filed at 7/26/2024 0754  Gross per 24 hour   Intake --   Output 4250 ml   Net -4250 ml         Physical Exam  Constitutional:       General: He is not in acute distress.     Appearance: Normal appearance. He is not ill-appearing, toxic-appearing or diaphoretic.   HENT:      Head: Normocephalic and atraumatic.      Nose: Nose normal.      Mouth/Throat:      Mouth: Mucous membranes are moist.   Eyes:      Extraocular Movements: Extraocular movements intact.      Conjunctiva/sclera: Conjunctivae normal.      Pupils: Pupils are equal, round, and reactive to light.   Cardiovascular:      Rate and Rhythm: Normal rate and regular rhythm.      Pulses: Normal pulses.      Heart sounds: Normal heart sounds. No murmur heard.     No gallop.   Pulmonary:      Effort: Pulmonary effort is normal. No respiratory distress.      Breath sounds: Normal breath sounds. No wheezing or rales.   Abdominal:      General: Abdomen is flat. Bowel sounds are normal. There is no distension.      Palpations: Abdomen is soft.      Tenderness: There is no abdominal tenderness. There is no guarding.   Musculoskeletal:          General: No swelling. Normal range of motion.      Cervical back: Normal range of motion and neck supple.   Skin:     General: Skin is warm and dry.      Capillary Refill: Capillary refill takes less than 2 seconds.   Neurological:      General: No focal deficit present.      Mental Status: He is alert and oriented to person, place, and time. Mental status is at baseline.   Psychiatric:         Mood and Affect: Mood normal.         Behavior: Behavior normal.         Thought Content: Thought content normal.         Judgment: Judgment normal.             Significant Labs: All pertinent labs within the past 24 hours have been reviewed.    Significant Imaging: I have reviewed all pertinent imaging results/findings within the past 24 hours.    Assessment/Plan:      * Seizure  -Presents after possible grand mal seizure based on description, although unable to reach father (witness) for further history, previous episodes attributed to pseudoseizures  -Neurologically intact, no further episodes since before arrival  -initially suspecting epileptic seizure given syncopal episode and reported post-ictal state, both of which differ from previous episodes  -CTH unremarkable  Plan:  -Contacted neurology, appreciate recommendations   -cEEG placed, negative for seizures  -psychiatry consulted for possible pseudoseizures, appreciate recommendations  -Seizure precautions      Non-traumatic rhabdomyolysis  -Presents with mild CK elevation 2.4k, increased from 1.9k two days prior  -initially thought to be in the setting of seizure however EEG negative.  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  Discussing discontinuation versus medication substitution with ID   -most likely needs outpt monitoring. Will weigh risk v benefit of continuing medication  -Cont IVF resuscitation, increase rate given CPK increase. Repeat level  tonight      Anxiety  Holding bupropion for now given possible seizures per above  Previously intolerant of prozac  Psychiatry:  Continue hold Wellbutrin.  Start Lexapro        VTE Risk Mitigation (From admission, onward)           Ordered     enoxaparin injection 40 mg  Daily         07/24/24 1538     IP VTE HIGH RISK PATIENT  Once         07/24/24 1538     Place sequential compression device  Until discontinued         07/24/24 1538     IP VTE HIGH RISK PATIENT  Once         07/24/24 1538     Place sequential compression device  Until discontinued         07/24/24 1538                    Discharge Planning   MYLA: 7/27/2024     Code Status: Full Code   Is the patient medically ready for discharge?:     Reason for patient still in hospital (select all that apply): Patient trending condition  Discharge Plan A: Home                  Paolo Parra DO  Department of Hospital Medicine   [facility] - [facility] Surg ([place]-[facility])

## 2024-07-28 VITALS
BODY MASS INDEX: 35.03 KG/M2 | SYSTOLIC BLOOD PRESSURE: 140 MMHG | TEMPERATURE: 98 F | DIASTOLIC BLOOD PRESSURE: 97 MMHG | HEART RATE: 84 BPM | WEIGHT: 244.69 LBS | OXYGEN SATURATION: 97 % | RESPIRATION RATE: 18 BRPM | HEIGHT: 70 IN

## 2024-07-28 PROBLEM — R56.9 SEIZURE: Status: RESOLVED | Noted: 2024-07-24 | Resolved: 2024-07-28

## 2024-07-28 PROBLEM — R56.9 SEIZURE-LIKE ACTIVITY: Status: RESOLVED | Noted: 2024-07-25 | Resolved: 2024-07-28

## 2024-07-28 LAB
ANION GAP SERPL CALC-SCNC: 8 MMOL/L (ref 8–16)
BASOPHILS # BLD AUTO: 0.04 K/UL (ref 0–0.2)
BASOPHILS NFR BLD: 0.6 % (ref 0–1.9)
BUN SERPL-MCNC: 10 MG/DL (ref 6–20)
CALCIUM SERPL-MCNC: 9.1 MG/DL (ref 8.7–10.5)
CHLORIDE SERPL-SCNC: 105 MMOL/L (ref 95–110)
CK SERPL-CCNC: 3914 U/L (ref 20–200)
CO2 SERPL-SCNC: 27 MMOL/L (ref 23–29)
CREAT SERPL-MCNC: 0.9 MG/DL (ref 0.5–1.4)
DIFFERENTIAL METHOD BLD: ABNORMAL
EOSINOPHIL # BLD AUTO: 0.1 K/UL (ref 0–0.5)
EOSINOPHIL NFR BLD: 1.7 % (ref 0–8)
ERYTHROCYTE [DISTWIDTH] IN BLOOD BY AUTOMATED COUNT: 12.1 % (ref 11.5–14.5)
EST. GFR  (NO RACE VARIABLE): >60 ML/MIN/1.73 M^2
GLUCOSE SERPL-MCNC: 88 MG/DL (ref 70–110)
HCT VFR BLD AUTO: 45.2 % (ref 40–54)
HGB BLD-MCNC: 15.1 G/DL (ref 14–18)
IMM GRANULOCYTES # BLD AUTO: 0.01 K/UL (ref 0–0.04)
IMM GRANULOCYTES NFR BLD AUTO: 0.1 % (ref 0–0.5)
LYMPHOCYTES # BLD AUTO: 2.6 K/UL (ref 1–4.8)
LYMPHOCYTES NFR BLD: 36.3 % (ref 18–48)
MAGNESIUM SERPL-MCNC: 1.8 MG/DL (ref 1.6–2.6)
MCH RBC QN AUTO: 32.7 PG (ref 27–31)
MCHC RBC AUTO-ENTMCNC: 33.4 G/DL (ref 32–36)
MCV RBC AUTO: 98 FL (ref 82–98)
MONOCYTES # BLD AUTO: 0.8 K/UL (ref 0.3–1)
MONOCYTES NFR BLD: 11.3 % (ref 4–15)
NEUTROPHILS # BLD AUTO: 3.5 K/UL (ref 1.8–7.7)
NEUTROPHILS NFR BLD: 50 % (ref 38–73)
NRBC BLD-RTO: 0 /100 WBC
PHOSPHATE SERPL-MCNC: 3.3 MG/DL (ref 2.7–4.5)
PLATELET # BLD AUTO: 260 K/UL (ref 150–450)
PMV BLD AUTO: 9.9 FL (ref 9.2–12.9)
POCT GLUCOSE: 111 MG/DL (ref 70–110)
POCT GLUCOSE: 121 MG/DL (ref 70–110)
POTASSIUM SERPL-SCNC: 3.7 MMOL/L (ref 3.5–5.1)
RBC # BLD AUTO: 4.62 M/UL (ref 4.6–6.2)
SODIUM SERPL-SCNC: 140 MMOL/L (ref 136–145)
WBC # BLD AUTO: 7.05 K/UL (ref 3.9–12.7)

## 2024-07-28 PROCEDURE — 36415 COLL VENOUS BLD VENIPUNCTURE: CPT | Performed by: STUDENT IN AN ORGANIZED HEALTH CARE EDUCATION/TRAINING PROGRAM

## 2024-07-28 PROCEDURE — 82550 ASSAY OF CK (CPK): CPT | Performed by: STUDENT IN AN ORGANIZED HEALTH CARE EDUCATION/TRAINING PROGRAM

## 2024-07-28 PROCEDURE — 84100 ASSAY OF PHOSPHORUS: CPT | Performed by: STUDENT IN AN ORGANIZED HEALTH CARE EDUCATION/TRAINING PROGRAM

## 2024-07-28 PROCEDURE — 80048 BASIC METABOLIC PNL TOTAL CA: CPT | Performed by: STUDENT IN AN ORGANIZED HEALTH CARE EDUCATION/TRAINING PROGRAM

## 2024-07-28 PROCEDURE — 85025 COMPLETE CBC W/AUTO DIFF WBC: CPT | Performed by: STUDENT IN AN ORGANIZED HEALTH CARE EDUCATION/TRAINING PROGRAM

## 2024-07-28 PROCEDURE — 83735 ASSAY OF MAGNESIUM: CPT | Performed by: STUDENT IN AN ORGANIZED HEALTH CARE EDUCATION/TRAINING PROGRAM

## 2024-07-28 PROCEDURE — 25000003 PHARM REV CODE 250: Performed by: STUDENT IN AN ORGANIZED HEALTH CARE EDUCATION/TRAINING PROGRAM

## 2024-07-28 RX ORDER — ESCITALOPRAM OXALATE 5 MG/1
5 TABLET ORAL DAILY
Qty: 60 TABLET | Refills: 1 | Status: SHIPPED | OUTPATIENT
Start: 2024-07-29 | End: 2024-11-26

## 2024-07-28 RX ADMIN — ESCITALOPRAM OXALATE 5 MG: 5 TABLET, FILM COATED ORAL at 09:07

## 2024-07-28 NOTE — PROGRESS NOTES
[facility] - [facility] ([facility])  Discharge Final Note    Primary Care Provider: Jordi Piper MD    Expected Discharge Date: 7/28/2024    Final Discharge Note (most recent)       Final Note - 07/25/24 1015          Final Note    Assessment Type Discharge Planning Assessment        Post-Acute Status    Coverage [employer]                     Important Message from Medicare           Patient's chart has been reviewed by CM. Patient does not have any additional needs. Patient is cleared to discharge home with his family.Patient  has discharge transportation home.

## 2024-07-28 NOTE — PLAN OF CARE
07/28/24 1004   Final Note   Assessment Type Final Discharge Note   Anticipated Discharge Disposition Home   Post-Acute Status   Discharge Delays None known at this time

## 2024-07-28 NOTE — DISCHARGE SUMMARY
[facility] - [facility] Surg ([place] [address])  [facility] Medicine  Discharge Summary      Patient Name: William Carter  MRN: [id number]  LEODAN: [id number]  Patient Class: IP- Inpatient  Admission Date: 7/24/2024  Hospital Length of Stay: 4 days  Discharge Date and Time:  07/28/2024 10:34 AM  Attending Physician: Paolo Parra DO   Discharging Provider: Paolo Parra DO  Primary Care Provider: Jordi Piper MD  [facility] Medicine Team: [facility] Paolo Parra DO  Primary Care Team: [facility]    HPI:   This is a 23-year-old male with a history of anxiety, 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 States that he felt light headed and had subsequent episode of vomiting. After trying to participate in exercise once again, he had a syncopal episode. Downtime unknown. He then went to ED, found to have elevated CPK 1.9k. S/p IVF. Symptoms attributed to heat stroke, was subsequently discharged from the ED. 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 Started feeling progressively more lightheaded throughout the day. He then went home to eat lunch with his dad. The last thing he recalls was telling him a story and subsequently passing out. Had reported generalized seizure like activity however duration unknown. Unable to reach father for further info. Unclear when his last seizure-like episode was, believes it was a year ago, previously attributed to pseudoseizures. Endorses that symptoms differ this time around in that he can usually remember events during episodes. This time, he blacked out completely. States that after this episode denies any urinary/bowel incontinence, tongue biting.  States that he remembers being woken up by EMS providers and feeling extremely dazed and confused. Denies further episodes since this AM.    ED course:  Upon arrival, CK level elevated at 2.4 K. CTH WO Contrast. S/p  IVF    * No surgery found *      Hospital Course:   Admitted with possible seizure like activity and rhabdomyolysis.  Started on aggressive IVF, titrating as indicated based on CK level/ UOP.  CPK downtrending, encouraged increased p.o. intake on discharge.  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  Repeat CPK in 1 week.  Briefly discussed Truvada with ID as may be contributing to rhabdomyolysis.  Per ID, is associated.  However, given that etiology most likely due to strenuous exercise, okay to monitor outpatient.  Patient counselled on risks and benefit of continuing Truvada, opting to continue.  Neurology consulted, placed on continuous EEG, negative for epileptiform activity.  Psychiatry was then consulted for management of anxiety/possible pseudoseizures.  Started on Lexapro. .     Physical Exam  Gen: in NAD, appears stated age  Neuro: AAOx3, motor, sensory, and strength grossly intact BL  HEENT: NTNC, EOMI, PERRL, MMM  CVS: RRR, no m/r/g; S1/S2 auscultated with no S3 or S4; capillary refill < 2 sec  Resp: lungs CTAB, no w/r/r; no belabored breathing or accessory muscle use appreciated   Abd: BS+ in all 4 quadrants; NTND, soft to palpation; no organomegaly appreciated   Extrem: pulses full, equal, and regular over all 4 extremities; no UE or LE edema BL    Goals of Care Treatment Preferences:  Code Status: Full Code      Consults:   Consults (From admission, onward)          Status Ordering Provider     Inpatient consult to PICC team (\[facility]\"")  Once        Provider:  (Not yet assigned)    Completed JOHN FAROOQ     Inpatient consult to Psychiatry  Once        Provider:  (Not yet assigned)    Completed JOHN FAROOQ     Inpatient consult to Neurology  Once        Provider:  Alan Do MD    Completed JOHN FAROOQ            Neuro  * Seizure-resolved as of 7/28/2024  -Presents after possible grand mal seizure based on description, although unable to reach father  (witness) for further history, previous episodes attributed to pseudoseizures  -Neurologically intact, no further episodes since before arrival  -initially suspecting epileptic seizure given syncopal episode and reported post-ictal state, both of which differ from previous episodes  -CTH unremarkable  Plan:  -Contacted neurology, appreciate recommendations   -cEEG placed, negative for seizures  -psychiatry consulted for possible pseudoseizures, appreciate recommendations  -Seizure precautions      Psychiatric  Anxiety  Holding bupropion for now given possible seizures per above  Previously intolerant of prozac  Psychiatry:  Continue hold Wellbutrin.  Start Lexapro      Orthopedic  Non-traumatic rhabdomyolysis  -Presents with mild CK elevation 2.4k, increased from 1.9k two days prior  -initially thought to be in the setting of seizure however EEG negative.  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  Discussing discontinuation versus medication substitution with ID   -most likely needs outpt monitoring. Will weigh risk v benefit of continuing medication  -Cont IVF resuscitation, increase rate given CPK increase. Repeat level tonight        Final Active Diagnoses:    Diagnosis Date Noted POA    Non-traumatic rhabdomyolysis [M62.82] 07/24/2024 Yes    Anxiety [F41.9] 10/08/2015 Yes    Depression [F32.A] 10/09/2014 Yes      Problems Resolved During this Admission:    Diagnosis Date Noted Date Resolved POA    PRINCIPAL PROBLEM:  Seizure [R56.9] 07/24/2024 07/28/2024 Yes    Seizure-like activity [R56.9] 07/25/2024 07/28/2024 Yes       Discharged Condition: good    Disposition:     Follow Up:    Patient Instructions:      CK   Standing Status: Future Standing Exp. Date: 10/26/25     Notify your health care provider if you experience any of the following:     Notify your health care provider if you experience any of the  following:  increased confusion or weakness     Notify your health care provider if you experience any of the following:  persistent dizziness, light-headedness, or visual disturbances     Notify your health care provider if you experience any of the following:  worsening rash     Notify your health care provider if you experience any of the following:  severe persistent headache     Notify your health care provider if you experience any of the following:  difficulty breathing or increased cough     Notify your health care provider if you experience any of the following:  redness, tenderness, or signs of infection (pain, swelling, redness, odor or green/yellow discharge around incision site)     Notify your health care provider if you experience any of the following:  severe uncontrolled pain     Notify your health care provider if you experience any of the following:  persistent nausea and vomiting or diarrhea     Notify your health care provider if you experience any of the following:  temperature >100.4       Significant Diagnostic Studies: N/A    Pending Diagnostic Studies:       None           Medications:  Reconciled Home Medications:      Medication List        START taking these medications      EScitalopram oxalate 5 MG Tab  Commonly known as: LEXAPRO  Take 1 tablet (5 mg total) by mouth once daily.  Start taking on: July 29, 2024            CONTINUE taking these medications      emtricitabine-tenofovir 200-300 mg 200-300 mg Tab  Commonly known as: TRUVADA  Take 1 tablet by mouth once daily.     ondansetron 4 MG Tbdl  Commonly known as: ZOFRAN-ODT  Take 1 tablet (4 mg total) by mouth every 8 (eight) hours as needed.            STOP taking these medications      buPROPion 150 MG TB24 tablet  Commonly known as: WELLBUTRIN XL              Indwelling Lines/Drains at time of discharge:   Lines/Drains/Airways       None                   Time spent on the discharge of patient: 35 minutes     Pt deemed appropriate  for discharge. Plan discussed with pt, who was agreeable and amenable; medications were discussed and reviewed, outpatient follow-up scheduled, ER precautions were given, all questions were answered to the pt's satisfaction, and Mr Carter was subsequently discharged.      Paolo Parra DO  Department of Hospital Medicine  [facility] - [facility] Surg ([facility]16)

## 2024-07-30 ENCOUNTER — PATIENT OUTREACH (OUTPATIENT)
Dept: ADMINISTRATIVE | Facility: CLINIC | Age: 24
End: 2024-07-30
Payer: COMMERCIAL

## 2024-07-30 NOTE — PROGRESS NOTES
C3 nurse spoke with William Carter for a TCC post hospital discharge follow up call. The patient has a scheduled HOSFU with his PCP, Jordi Piper MD on 8/2/24 at 1000. No messages routed at this time.

## 2024-08-02 ENCOUNTER — OFFICE VISIT (OUTPATIENT)
Dept: INTERNAL MEDICINE | Facility: CLINIC | Age: 24
End: 2024-08-02
Payer: COMMERCIAL

## 2024-08-02 ENCOUNTER — LAB VISIT (OUTPATIENT)
Dept: LAB | Facility: HOSPITAL | Age: 24
End: 2024-08-02
Attending: STUDENT IN AN ORGANIZED HEALTH CARE EDUCATION/TRAINING PROGRAM
Payer: COMMERCIAL

## 2024-08-02 VITALS
SYSTOLIC BLOOD PRESSURE: 118 MMHG | WEIGHT: 241.19 LBS | OXYGEN SATURATION: 97 % | HEIGHT: 70 IN | BODY MASS INDEX: 34.53 KG/M2 | DIASTOLIC BLOOD PRESSURE: 72 MMHG | HEART RATE: 76 BPM

## 2024-08-02 DIAGNOSIS — Z09 HOSPITAL DISCHARGE FOLLOW-UP: Primary | ICD-10-CM

## 2024-08-02 DIAGNOSIS — R56.9 SEIZURE-LIKE ACTIVITY: ICD-10-CM

## 2024-08-02 DIAGNOSIS — F41.9 ANXIETY: ICD-10-CM

## 2024-08-02 DIAGNOSIS — M62.82 NON-TRAUMATIC RHABDOMYOLYSIS: ICD-10-CM

## 2024-08-02 DIAGNOSIS — R73.03 PREDIABETES: ICD-10-CM

## 2024-08-02 LAB
ALBUMIN SERPL BCP-MCNC: 4.1 G/DL (ref 3.5–5.2)
ALP SERPL-CCNC: 65 U/L (ref 55–135)
ALT SERPL W/O P-5'-P-CCNC: 59 U/L (ref 10–44)
ANION GAP SERPL CALC-SCNC: 7 MMOL/L (ref 8–16)
AST SERPL-CCNC: 28 U/L (ref 10–40)
BILIRUB SERPL-MCNC: 0.3 MG/DL (ref 0.1–1)
BUN SERPL-MCNC: 14 MG/DL (ref 6–20)
CALCIUM SERPL-MCNC: 9.2 MG/DL (ref 8.7–10.5)
CHLORIDE SERPL-SCNC: 106 MMOL/L (ref 95–110)
CK SERPL-CCNC: 207 U/L (ref 20–200)
CO2 SERPL-SCNC: 25 MMOL/L (ref 23–29)
CREAT SERPL-MCNC: 1.3 MG/DL (ref 0.5–1.4)
EST. GFR  (NO RACE VARIABLE): >60 ML/MIN/1.73 M^2
ESTIMATED AVG GLUCOSE: 131 MG/DL (ref 68–131)
GLUCOSE SERPL-MCNC: 130 MG/DL (ref 70–110)
HBA1C MFR BLD: 6.2 % (ref 4–5.6)
POTASSIUM SERPL-SCNC: 4.1 MMOL/L (ref 3.5–5.1)
PROT SERPL-MCNC: 7.1 G/DL (ref 6–8.4)
SODIUM SERPL-SCNC: 138 MMOL/L (ref 136–145)

## 2024-08-02 PROCEDURE — 80053 COMPREHEN METABOLIC PANEL: CPT | Performed by: STUDENT IN AN ORGANIZED HEALTH CARE EDUCATION/TRAINING PROGRAM

## 2024-08-02 PROCEDURE — 83036 HEMOGLOBIN GLYCOSYLATED A1C: CPT | Performed by: STUDENT IN AN ORGANIZED HEALTH CARE EDUCATION/TRAINING PROGRAM

## 2024-08-02 PROCEDURE — 36415 COLL VENOUS BLD VENIPUNCTURE: CPT | Performed by: STUDENT IN AN ORGANIZED HEALTH CARE EDUCATION/TRAINING PROGRAM

## 2024-08-02 PROCEDURE — 82550 ASSAY OF CK (CPK): CPT | Performed by: STUDENT IN AN ORGANIZED HEALTH CARE EDUCATION/TRAINING PROGRAM

## 2024-08-02 PROCEDURE — 99999 PR PBB SHADOW E&M-EST. PATIENT-LVL III: CPT | Mod: PBBFAC,,, | Performed by: STUDENT IN AN ORGANIZED HEALTH CARE EDUCATION/TRAINING PROGRAM

## 2024-08-02 NOTE — PROGRESS NOTES
SUBJECTIVE     Chief Complaint   Patient presents with    Hospital Follow Up       HPI  William Carter is a 23 y.o. male with  Anxiety, depression, psychogenic nonepileptic seizures  that presents for  hospital discharge follow up .     Admitted from 7/24/24 to 7/28/24 for non-traumatic rhabdomyolysis and seizure-like activity.     Transitional Care Note    Family and/or Caretaker present at visit?  No.  Diagnostic tests reviewed/disposition: I have reviewed all completed as well as pending diagnostic tests at the time of discharge.  Disease/illness education: Provided during visit.   Home health/community services discussion/referrals: Patient does not have home health established from hospital visit.  They do not need home health.  If needed, we will set up home health for the patient.   Establishment or re-establishment of referral orders for community resources: No other necessary community resources.   Discussion with other health care providers: No discussion with other health care providers necessary.     Per discharge summary:   Hospital Course:   Admitted with possible seizure like activity and rhabdomyolysis.  Started on aggressive IVF, titrating as indicated based on CK level/ UOP.  CPK downtrending, encouraged increased p.o. intake on discharge.  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  Repeat CPK in 1 week.  Briefly discussed Truvada with ID as may be contributing to rhabdomyolysis.  Per ID, is associated.  However, given that etiology most likely due to strenuous exercise, okay to monitor outpatient.  Patient counselled on risks and benefit of continuing Truvada, opting to continue.  Neurology consulted, placed on continuous EEG, negative for epileptiform activity.  Psychiatry was then consulted for management of anxiety/possible pseudoseizures.  Started on Lexapro. .      Medication changes at discharge:   Start: Lexapro 5 mg qd.   Stop: Bupropion 150 mg qd.  "  Continue: Truvada 200-300 mg qd for PrEP    Patient states that he feels better today. Anxiety stable on Lexapro. Has some mild joint pain and intermittent lightheadedness still. He is trying to hydrate well. Denies chest pain, SOB, nausea, vomiting, palpitations, presyncopal symptoms. No recent seizure-like activity.     PAST MEDICAL HISTORY:  Past Medical History:   Diagnosis Date    Concussion     may 2014    Depression     History of psychiatric hospitalization     Panic attacks        PAST SURGICAL HISTORY:  Past Surgical History:   Procedure Laterality Date    ESOPHAGOGASTRODUODENOSCOPY N/A 8/15/2018    Procedure: ESOPHAGOGASTRODUODENOSCOPY (EGD);  Surgeon: Fatmata Lam MD;  Location: [address]);  Service: Endoscopy;  Laterality: N/A;       FAMILY HISTORY:  Family History   Problem Relation Name Age of Onset    Hypertension Father      Diabetes Father      Depression Maternal Grandmother          situational    Diabetes Paternal Grandmother      Diabetes Paternal Grandfather      Bipolar disorder Mother      Drug abuse Mother         ALLERGIES AND MEDICATIONS: updated and reviewed.  Review of patient's allergies indicates:  No Known Allergies  Current Outpatient Medications   Medication Sig Dispense Refill    emtricitabine-tenofovir 200-300 mg (TRUVADA) 200-300 mg Tab Take 1 tablet by mouth once daily. 90 tablet 0    EScitalopram oxalate (LEXAPRO) 5 MG Tab Take 1 tablet (5 mg total) by mouth once daily. 60 tablet 1    ondansetron (ZOFRAN-ODT) 4 MG TbDL Take 1 tablet (4 mg total) by mouth every 8 (eight) hours as needed. 14 tablet 1     No current facility-administered medications for this visit.       ROS  Review of Systems   All other systems reviewed and are negative.        OBJECTIVE     Physical Exam  Vitals:    08/02/24 1001   BP: 118/72   Pulse: 76    Body mass index is 34.61 kg/m².  Weight: 109.4 kg (241 lb 2.9 oz)   Height: 5' 10" (177.8 cm)     Physical Exam  Vitals reviewed. "   Constitutional:       General: He is not in acute distress.     Appearance: Normal appearance.   HENT:      Head: Normocephalic and atraumatic.      Mouth/Throat:      Mouth: Mucous membranes are moist.      Pharynx: Oropharynx is clear.   Eyes:      Extraocular Movements: Extraocular movements intact.      Conjunctiva/sclera: Conjunctivae normal.      Pupils: Pupils are equal, round, and reactive to light.   Cardiovascular:      Rate and Rhythm: Normal rate and regular rhythm.      Pulses: Normal pulses.      Heart sounds: Normal heart sounds.   Pulmonary:      Effort: Pulmonary effort is normal.      Breath sounds: Normal breath sounds.   Abdominal:      General: There is no distension.   Musculoskeletal:         General: Normal range of motion.      Cervical back: Normal range of motion and neck supple.   Skin:     General: Skin is warm and dry.   Neurological:      General: No focal deficit present.      Mental Status: He is alert.           Health Maintenance         Date Due Completion Date    COVID-19 Vaccine (3 - 2023-24 season) 09/01/2023 6/4/2021    Hemoglobin A1c (Prediabetes) 04/12/2024 4/12/2023    Influenza Vaccine (1) 09/01/2024 10/11/2019    TETANUS VACCINE 03/08/2029 3/8/2019              ASSESSMENT     23 y.o. male with     1. Hospital discharge follow-up    2. Non-traumatic rhabdomyolysis    3. Seizure-like activity    4. Prediabetes    5. Anxiety        PLAN:     1. Hospital discharge follow-up  - Discharge summary reviewed, discharge medication reconciliation reviewed with patient in clinic today.    2. Non-traumatic rhabdomyolysis  - Encouraged good hydration, hydrating before being in heat. Frequent breaks with physical activity.   - COMPREHENSIVE METABOLIC PANEL; Future  - CK; Future    3. Seizure-like activity  - H/o psychogenic non-epileptic seizures. Stopped Bupropion, continue Lexapro.   - COMPREHENSIVE METABOLIC PANEL; Future    4. Prediabetes  - HEMOGLOBIN A1C; Future    5. Anxiety  -  Stable on SSRI. Continue Lexapro.     RTC in 3 months       Jordi Piper MD  Family Medicine  [facility]  08/02/2024    This document was created using voice recognition software ([employer]). Although it may be edited, this document may contain errors related to incorrect recognition of the spoken word. Please call the physician if clarification is needed.       No follow-ups on file.

## 2024-08-05 ENCOUNTER — PATIENT MESSAGE (OUTPATIENT)
Dept: INTERNAL MEDICINE | Facility: CLINIC | Age: 24
End: 2024-08-05
Payer: COMMERCIAL

## 2024-08-05 DIAGNOSIS — R73.03 PREDIABETES: Primary | ICD-10-CM

## 2024-08-06 RX ORDER — METFORMIN HYDROCHLORIDE 750 MG/1
750 TABLET, EXTENDED RELEASE ORAL
Qty: 90 TABLET | Refills: 3 | Status: SHIPPED | OUTPATIENT
Start: 2024-08-06 | End: 2025-08-06

## 2024-09-03 ENCOUNTER — PATIENT MESSAGE (OUTPATIENT)
Dept: INTERNAL MEDICINE | Facility: CLINIC | Age: 24
End: 2024-09-03
Payer: COMMERCIAL

## 2024-09-03 ENCOUNTER — TELEPHONE (OUTPATIENT)
Dept: INTERNAL MEDICINE | Facility: CLINIC | Age: 24
End: 2024-09-03
Payer: COMMERCIAL

## 2024-09-03 DIAGNOSIS — F32.A DEPRESSION, UNSPECIFIED DEPRESSION TYPE: Primary | ICD-10-CM

## 2024-09-03 DIAGNOSIS — F41.1 GENERALIZED ANXIETY DISORDER: ICD-10-CM

## 2024-09-03 RX ORDER — VILAZODONE HYDROCHLORIDE 10 MG/1
10 TABLET ORAL DAILY
Qty: 30 TABLET | Refills: 2 | Status: SHIPPED | OUTPATIENT
Start: 2024-09-03

## 2024-09-03 NOTE — TELEPHONE ENCOUNTER
Called patient to check in.   Currently on Lexapro 5 mg qd for depression with anxiety.   Reporting daily headaches a low sex drive on this medication.   No recent seizure-like activity.   Will transition from Lexapro to Vilazodone. Has appointment scheduled with me for follow up.

## 2024-09-12 DIAGNOSIS — Z72.52 HIGH RISK HOMOSEXUAL BEHAVIOR: ICD-10-CM

## 2024-09-12 RX ORDER — EMTRICITABINE AND TENOFOVIR DISOPROXIL FUMARATE 200; 300 MG/1; MG/1
1 TABLET, FILM COATED ORAL DAILY
Qty: 90 TABLET | Refills: 0 | Status: ACTIVE | OUTPATIENT
Start: 2024-09-12

## 2024-11-06 ENCOUNTER — OFFICE VISIT (OUTPATIENT)
Dept: INTERNAL MEDICINE | Facility: CLINIC | Age: 24
End: 2024-11-06
Payer: COMMERCIAL

## 2024-11-06 VITALS
OXYGEN SATURATION: 98 % | BODY MASS INDEX: 34.94 KG/M2 | DIASTOLIC BLOOD PRESSURE: 74 MMHG | HEART RATE: 91 BPM | SYSTOLIC BLOOD PRESSURE: 120 MMHG | WEIGHT: 244.06 LBS | HEIGHT: 70 IN

## 2024-11-06 DIAGNOSIS — Z79.899 ON PRE-EXPOSURE PROPHYLAXIS FOR HIV: ICD-10-CM

## 2024-11-06 DIAGNOSIS — R73.03 PREDIABETES: ICD-10-CM

## 2024-11-06 DIAGNOSIS — R51.9 NONINTRACTABLE EPISODIC HEADACHE, UNSPECIFIED HEADACHE TYPE: Primary | ICD-10-CM

## 2024-11-06 DIAGNOSIS — F41.1 GENERALIZED ANXIETY DISORDER: ICD-10-CM

## 2024-11-06 PROCEDURE — 1160F RVW MEDS BY RX/DR IN RCRD: CPT | Mod: CPTII,S$GLB,, | Performed by: STUDENT IN AN ORGANIZED HEALTH CARE EDUCATION/TRAINING PROGRAM

## 2024-11-06 PROCEDURE — 3074F SYST BP LT 130 MM HG: CPT | Mod: CPTII,S$GLB,, | Performed by: STUDENT IN AN ORGANIZED HEALTH CARE EDUCATION/TRAINING PROGRAM

## 2024-11-06 PROCEDURE — 1159F MED LIST DOCD IN RCRD: CPT | Mod: CPTII,S$GLB,, | Performed by: STUDENT IN AN ORGANIZED HEALTH CARE EDUCATION/TRAINING PROGRAM

## 2024-11-06 PROCEDURE — 3008F BODY MASS INDEX DOCD: CPT | Mod: CPTII,S$GLB,, | Performed by: STUDENT IN AN ORGANIZED HEALTH CARE EDUCATION/TRAINING PROGRAM

## 2024-11-06 PROCEDURE — 3078F DIAST BP <80 MM HG: CPT | Mod: CPTII,S$GLB,, | Performed by: STUDENT IN AN ORGANIZED HEALTH CARE EDUCATION/TRAINING PROGRAM

## 2024-11-06 PROCEDURE — 99999 PR PBB SHADOW E&M-EST. PATIENT-LVL IV: CPT | Mod: PBBFAC,,, | Performed by: STUDENT IN AN ORGANIZED HEALTH CARE EDUCATION/TRAINING PROGRAM

## 2024-11-06 PROCEDURE — 3044F HG A1C LEVEL LT 7.0%: CPT | Mod: CPTII,S$GLB,, | Performed by: STUDENT IN AN ORGANIZED HEALTH CARE EDUCATION/TRAINING PROGRAM

## 2024-11-06 PROCEDURE — 99214 OFFICE O/P EST MOD 30 MIN: CPT | Mod: S$GLB,,, | Performed by: STUDENT IN AN ORGANIZED HEALTH CARE EDUCATION/TRAINING PROGRAM

## 2024-11-06 RX ORDER — METFORMIN HYDROCHLORIDE 500 MG/1
500 TABLET, EXTENDED RELEASE ORAL
Qty: 30 TABLET | Refills: 11 | Status: SHIPPED | OUTPATIENT
Start: 2024-11-06 | End: 2025-11-06

## 2024-11-06 RX ORDER — RIZATRIPTAN BENZOATE 10 MG/1
TABLET, ORALLY DISINTEGRATING ORAL
Qty: 30 TABLET | Refills: 2 | Status: SHIPPED | OUTPATIENT
Start: 2024-11-06

## 2024-11-06 NOTE — PATIENT INSTRUCTIONS
Metformin dose was decreased. Let me know if you still have diarrhea. If so, we can stop medication.   Try Maxalt as needed for headaches.   Get labs done in 1 month for PrEP

## 2024-12-05 DIAGNOSIS — Z72.52 HIGH RISK HOMOSEXUAL BEHAVIOR: ICD-10-CM

## 2024-12-06 RX ORDER — EMTRICITABINE AND TENOFOVIR DISOPROXIL FUMARATE 200; 300 MG/1; MG/1
1 TABLET, FILM COATED ORAL DAILY
Qty: 90 TABLET | Refills: 0 | Status: ACTIVE | OUTPATIENT
Start: 2024-12-06

## 2024-12-12 ENCOUNTER — PATIENT MESSAGE (OUTPATIENT)
Dept: INTERNAL MEDICINE | Facility: CLINIC | Age: 24
End: 2024-12-12
Payer: COMMERCIAL

## 2024-12-12 ENCOUNTER — TELEPHONE (OUTPATIENT)
Dept: INTERNAL MEDICINE | Facility: CLINIC | Age: 24
End: 2024-12-12
Payer: COMMERCIAL

## 2024-12-12 DIAGNOSIS — R73.09 ELEVATED HEMOGLOBIN A1C MEASUREMENT: Primary | ICD-10-CM

## 2024-12-12 NOTE — TELEPHONE ENCOUNTER
----- Message from Jordi Piper MD sent at 12/12/2024  3:39 PM CST -----  Labs in. Please call to schedule.. Needs to fast. Labs are due to elevated A1c number. MyChart message sent.   Would like to schedule him a virtual visit in 2 weeks to discuss results    Thanks,   TP

## 2025-01-03 ENCOUNTER — OFFICE VISIT (OUTPATIENT)
Dept: INTERNAL MEDICINE | Facility: CLINIC | Age: 25
End: 2025-01-03

## 2025-01-03 DIAGNOSIS — E11.65 TYPE 2 DIABETES MELLITUS WITH HYPERGLYCEMIA, WITH LONG-TERM CURRENT USE OF INSULIN: Primary | ICD-10-CM

## 2025-01-03 DIAGNOSIS — T38.3X5A ADVERSE EFFECT OF METFORMIN, INITIAL ENCOUNTER: ICD-10-CM

## 2025-01-03 DIAGNOSIS — Z72.0 VAPES NICOTINE CONTAINING SUBSTANCE: ICD-10-CM

## 2025-01-03 DIAGNOSIS — Z79.4 TYPE 2 DIABETES MELLITUS WITH HYPERGLYCEMIA, WITH LONG-TERM CURRENT USE OF INSULIN: Primary | ICD-10-CM

## 2025-01-03 RX ORDER — SEMAGLUTIDE 0.68 MG/ML
0.5 INJECTION, SOLUTION SUBCUTANEOUS
Qty: 3 ML | Refills: 11 | Status: SHIPPED | OUTPATIENT
Start: 2025-01-03 | End: 2025-12-29

## 2025-01-03 RX ORDER — IBUPROFEN 200 MG
1 TABLET ORAL DAILY
Qty: 28 PATCH | Refills: 1 | Status: SHIPPED | OUTPATIENT
Start: 2025-01-03

## 2025-01-03 NOTE — PROGRESS NOTES
Subjective:       Patient ID: William Carter is a 24 y.o. male.    Chief Complaint: Diabetes      The patient location is: [place], [place]  The chief complaint leading to consultation is: Discuss new-onset diabetes    Visit type: audiovisual    Face to Face time with patient: 12 minutes  30 minutes of total time spent on the encounter, which includes face to face time and non-face to face time preparing to see the patient (eg, review of tests), Obtaining and/or reviewing separately obtained history, Documenting clinical information in the electronic or other health record, Independently interpreting results (not separately reported) and communicating results to the patient/family/caregiver, or Care coordination (not separately reported).         Each patient to whom he or she provides medical services by telemedicine is:  (1) informed of the relationship between the physician and patient and the respective role of any other health care provider with respect to management of the patient; and (2) notified that he or she may decline to receive medical services by telemedicine and may withdraw from such care at any time.    Notes:     HPI  DIABETES:  His A1C has progressively increased from 5.8 to 6.2, and is now at 7. He has a family history of type 2 diabetes. He reports poor eating habits due to financial constraints. He experiences nausea and diarrhea when taking Metformin XR that he was started to prevent progression of prediabetes to diabetes.     SUBSTANCE USE:  He currently uses nicotine vapes. He previously attempted cessation with nicotine gum but found it unfavorable. He expresses interest in exploring smoking cessation alternatives.       Review of Systems   Constitutional:  Positive for activity change. Negative for unexpected weight change.   HENT:  Negative for hearing loss, rhinorrhea and trouble swallowing.    Eyes:  Negative for discharge and visual disturbance.   Respiratory:  Negative for chest  "tightness and wheezing.    Cardiovascular:  Negative for chest pain and palpitations.   Gastrointestinal:  Negative for blood in stool, constipation, diarrhea and vomiting.   Endocrine: Negative for polydipsia and polyuria.   Genitourinary:  Negative for difficulty urinating, hematuria and urgency.   Musculoskeletal:  Negative for arthralgias, joint swelling and neck pain.   Neurological:  Negative for weakness and headaches.   Psychiatric/Behavioral:  Positive for dysphoric mood. Negative for confusion.          Objective:     Gen: Well Appearing, NAD, conversant.   HEENT: PERR, EOMI  Chest: Normal work of breathing  Resp: No Conversational Dyspnea  Neuro: A&Ox3,  speech normal  Mood: Mood normal, behavior normal, thought process linear     Assessment:       Problem List Items Addressed This Visit    None  Visit Diagnoses       Type 2 diabetes mellitus with hyperglycemia, with long-term current use of insulin    -  Primary    Relevant Medications    semaglutide (OZEMPIC) 0.25 mg or 0.5 mg (2 mg/3 mL) pen injector    Vapes nicotine containing substance        Relevant Medications    nicotine (NICODERM CQ) 21 mg/24 hr    Adverse effect of metformin, initial encounter        Relevant Medications    semaglutide (OZEMPIC) 0.25 mg or 0.5 mg (2 mg/3 mL) pen injector            Plan:       William JOSH "William" was seen today for diabetes.    Diagnoses and all orders for this visit:    Type 2 diabetes mellitus with hyperglycemia, with long-term current use of insulin  - Counseled patient about diagnosis.   - Reviewed diabetic diet.   - Will refer to diabetes education.   - Adverse effects of diarrhea and nausea on Metformin XR. Will discontinue this medication. Start Ozempic. Counseled patient on safe and effective use of new medication, including common adverse effects. Patient verbalized understanding.   - Patient may have had hyperglycemia secondary to Truvada use for PrEP--will revisit if sugars prove hard to control.   -    "  semaglutide (OZEMPIC) 0.25 mg or 0.5 mg (2 mg/3 mL) pen injector; Inject 0.5 mg into the skin every 7 days.    Vapes nicotine containing substance  - Counseled on need to stop vaping.   - Trial of nicoderm patches.   -     nicotine (NICODERM CQ) 21 mg/24 hr; Place 1 patch onto the skin once daily.    Adverse effect of metformin, initial encounter  - Patient with increased diarrhea, nausea/vomiting on Metformin. Discontinue. Start Ozempic for treatment of diabetes.   -     semaglutide (OZEMPIC) 0.25 mg or 0.5 mg (2 mg/3 mL) pen injector; Inject 0.5 mg into the skin every 7 days.           RTC in 3 months      Jordi Piper MD  Family Medicine  [facility]  01/03/2025

## 2025-01-04 ENCOUNTER — TELEPHONE (OUTPATIENT)
Dept: INTERNAL MEDICINE | Facility: CLINIC | Age: 25
End: 2025-01-04
Payer: COMMERCIAL

## 2025-01-04 NOTE — TELEPHONE ENCOUNTER
Prior authorization has been initiated through [employer]. Authorization is currently pending approval by insurance company.May take 24-72 hours for your insurance to respond

## 2025-01-04 NOTE — TELEPHONE ENCOUNTER
----- Message from Jordi Piper MD sent at 1/3/2025  5:12 PM CST -----  Regarding: PA Ozempic  Please start PA for Ozempic.   ICD-10: E11.65 (Type 2 DM with hyperglycemia); T38.3X5A (Adverse reaction to Metformin)  Most recent A1c 7%.   Medications tried: Metformin  mg qd--could not tolerate due to nausea, diarrhea.     Thanks,   TP

## 2025-01-06 ENCOUNTER — TELEPHONE (OUTPATIENT)
Dept: INTERNAL MEDICINE | Facility: CLINIC | Age: 25
End: 2025-01-06
Payer: COMMERCIAL

## 2025-01-06 NOTE — TELEPHONE ENCOUNTER
----- Message from Jordi Piper MD sent at 1/3/2025  5:11 PM CST -----  Regarding: Follow up  Needs in person appointment in 3 months, call to schedule    Thanks,   TP

## 2025-01-28 ENCOUNTER — E-VISIT (OUTPATIENT)
Dept: INTERNAL MEDICINE | Facility: CLINIC | Age: 25
End: 2025-01-28
Payer: COMMERCIAL

## 2025-01-28 DIAGNOSIS — F41.1 GENERALIZED ANXIETY DISORDER: ICD-10-CM

## 2025-01-28 DIAGNOSIS — F32.A DEPRESSION, UNSPECIFIED DEPRESSION TYPE: Primary | ICD-10-CM

## 2025-01-30 RX ORDER — VILAZODONE HYDROCHLORIDE 20 MG/1
20 TABLET ORAL DAILY
Qty: 30 TABLET | Refills: 11 | Status: SHIPPED | OUTPATIENT
Start: 2025-01-30 | End: 2026-01-30

## 2025-01-30 NOTE — PROGRESS NOTES
Patient ID: William Carter is a 24 y.o. male.    Chief Complaint: General Illness (Entered automatically based on patient selection in [employer].)          274}  The patient initiated a request through [employer] on 1/28/2025 for evaluation and management with a chief complaint of General Illness (Entered automatically based on patient selection in [employer].)     I evaluated the questionnaire submission on 01/30/2025 .          [facility], [place], [facility]    8/31/2024  9:37 PM CDT - Filed by Patient   What do you need help with? Other Concern   Do you agree to participate in an E-Visit? Yes   If you have any of the following symptoms, please present to your local emergency room or call 911:  I acknowledge   What is the main issue you would like addressed today? Depression   Please describe your symptoms Feeling Numb, major mood swings   Where is your problem located? brain   How severe are your symptoms? Moderate   Have you had these symptoms before? No   How long have you been having these symptoms? For a few days   Please list any medications or treatments you have used for your condition and indicate if it was effective or not.    What makes this feel better? Nothing   What makes this feel worse? N/A   Are these symptoms related to a condition that you currently have? I am not sure   What is the condition?    When were you last seen for this condition?    Please describe any probable cause for these symptoms Unknown   Provide any additional information you feel is important. Im Feeling numb, like a zombie,   Please attach any relevant images or files    Are you able to take your vital signs? 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    Depression, unspecified depression type Yes    Generalized anxiety disorder         Orders Placed This Encounter   Procedures    Ambulatory referral/consult to Psychiatry     Standing Status:   Future     Standing Expiration Date:   2026     Referral Priority:   Routine     Referral Type:   Psychiatric     Referral Reason:   Specialty Services Required     Requested Specialty:   Psychiatry     Number of Visits Requested:   1      Medications Ordered This Encounter   Medications    vilazodone (VIIBRYD) 20 mg Tab     Sig: Take 1 tablet (20 mg total) by mouth Daily.     Dispense:  30 tablet     Refill:  11        E-Visit Time Trackin minutes                274}

## 2025-02-26 DIAGNOSIS — Z72.52 HIGH RISK HOMOSEXUAL BEHAVIOR: ICD-10-CM

## 2025-02-28 RX ORDER — EMTRICITABINE AND TENOFOVIR DISOPROXIL FUMARATE 200; 300 MG/1; MG/1
1 TABLET, FILM COATED ORAL DAILY
Qty: 90 TABLET | Refills: 0 | Status: ACTIVE | OUTPATIENT
Start: 2025-02-28

## 2025-03-28 ENCOUNTER — PATIENT MESSAGE (OUTPATIENT)
Dept: ADMINISTRATIVE | Facility: OTHER | Age: 25
End: 2025-03-28
Payer: COMMERCIAL

## 2025-04-01 ENCOUNTER — PATIENT MESSAGE (OUTPATIENT)
Dept: INTERNAL MEDICINE | Facility: CLINIC | Age: 25
End: 2025-04-01
Payer: COMMERCIAL

## 2025-04-04 ENCOUNTER — OFFICE VISIT (OUTPATIENT)
Dept: INTERNAL MEDICINE | Facility: CLINIC | Age: 25
End: 2025-04-04
Payer: COMMERCIAL

## 2025-04-04 ENCOUNTER — LAB VISIT (OUTPATIENT)
Dept: LAB | Facility: HOSPITAL | Age: 25
End: 2025-04-04
Attending: STUDENT IN AN ORGANIZED HEALTH CARE EDUCATION/TRAINING PROGRAM
Payer: COMMERCIAL

## 2025-04-04 VITALS
OXYGEN SATURATION: 98 % | DIASTOLIC BLOOD PRESSURE: 80 MMHG | HEIGHT: 70 IN | BODY MASS INDEX: 32.61 KG/M2 | WEIGHT: 227.75 LBS | SYSTOLIC BLOOD PRESSURE: 100 MMHG | HEART RATE: 92 BPM

## 2025-04-04 DIAGNOSIS — Z79.899 ON PRE-EXPOSURE PROPHYLAXIS FOR HIV: ICD-10-CM

## 2025-04-04 DIAGNOSIS — G47.00 DIFFICULTY FALLING ASLEEP AT NIGHT UNTIL EARLY MORNING HOURS: ICD-10-CM

## 2025-04-04 DIAGNOSIS — R19.7 DIARRHEA, UNSPECIFIED TYPE: Primary | ICD-10-CM

## 2025-04-04 DIAGNOSIS — R19.7 DIARRHEA, UNSPECIFIED TYPE: ICD-10-CM

## 2025-04-04 LAB
ALBUMIN SERPL BCP-MCNC: 4.5 G/DL (ref 3.5–5.2)
ALP SERPL-CCNC: 76 UNIT/L (ref 40–150)
ALT SERPL W/O P-5'-P-CCNC: 66 UNIT/L (ref 10–44)
ANION GAP (OHS): 9 MMOL/L (ref 8–16)
AST SERPL-CCNC: 34 UNIT/L (ref 11–45)
BILIRUB SERPL-MCNC: 0.5 MG/DL (ref 0.1–1)
BUN SERPL-MCNC: 10 MG/DL (ref 6–20)
CALCIUM SERPL-MCNC: 9.7 MG/DL (ref 8.7–10.5)
CHLORIDE SERPL-SCNC: 102 MMOL/L (ref 95–110)
CO2 SERPL-SCNC: 27 MMOL/L (ref 23–29)
CREAT SERPL-MCNC: 0.9 MG/DL (ref 0.5–1.4)
EAG (OHS): 123 MG/DL (ref 68–131)
GFR SERPLBLD CREATININE-BSD FMLA CKD-EPI: >60 ML/MIN/1.73/M2
GLUCOSE SERPL-MCNC: 81 MG/DL (ref 70–110)
HAV IGM SERPL QL IA: NORMAL
HBA1C MFR BLD: 5.9 % (ref 4–5.6)
HBV CORE IGM SERPL QL IA: NORMAL
HBV SURFACE AG SERPL QL IA: NORMAL
HCV AB SERPL QL IA: NORMAL
HIV 1+2 AB+HIV1 P24 AG SERPL QL IA: NORMAL
LIPASE SERPL-CCNC: 20 U/L (ref 4–60)
POTASSIUM SERPL-SCNC: 4.2 MMOL/L (ref 3.5–5.1)
PROT SERPL-MCNC: 8.1 GM/DL (ref 6–8.4)
SODIUM SERPL-SCNC: 138 MMOL/L (ref 136–145)
T PALLIDUM IGG+IGM SER QL: NORMAL

## 2025-04-04 PROCEDURE — 80053 COMPREHEN METABOLIC PANEL: CPT

## 2025-04-04 PROCEDURE — 99999 PR PBB SHADOW E&M-EST. PATIENT-LVL IV: CPT | Mod: PBBFAC,,, | Performed by: STUDENT IN AN ORGANIZED HEALTH CARE EDUCATION/TRAINING PROGRAM

## 2025-04-04 PROCEDURE — 83036 HEMOGLOBIN GLYCOSYLATED A1C: CPT

## 2025-04-04 PROCEDURE — 83690 ASSAY OF LIPASE: CPT

## 2025-04-04 PROCEDURE — 36415 COLL VENOUS BLD VENIPUNCTURE: CPT

## 2025-04-04 PROCEDURE — 80074 ACUTE HEPATITIS PANEL: CPT

## 2025-04-04 PROCEDURE — 87389 HIV-1 AG W/HIV-1&-2 AB AG IA: CPT

## 2025-04-04 PROCEDURE — 86593 SYPHILIS TEST NON-TREP QUANT: CPT

## 2025-04-04 RX ORDER — ZOLPIDEM TARTRATE 5 MG/1
5 TABLET ORAL NIGHTLY PRN
Qty: 30 TABLET | Refills: 0 | Status: SHIPPED | OUTPATIENT
Start: 2025-04-04

## 2025-04-04 RX ORDER — AZITHROMYCIN 500 MG/1
500 TABLET, FILM COATED ORAL DAILY
Qty: 3 TABLET | Refills: 0 | Status: SHIPPED | OUTPATIENT
Start: 2025-04-04

## 2025-04-04 RX ORDER — CHOLESTYRAMINE 4 G/9G
4 POWDER, FOR SUSPENSION ORAL 3 TIMES DAILY PRN
Qty: 30 PACKET | Refills: 0 | Status: SHIPPED | OUTPATIENT
Start: 2025-04-04

## 2025-04-04 RX ORDER — DICYCLOMINE HYDROCHLORIDE 20 MG/1
20 TABLET ORAL 4 TIMES DAILY PRN
COMMUNITY
Start: 2025-03-30

## 2025-04-04 NOTE — PROGRESS NOTES
SUBJECTIVE     Chief Complaint   Patient presents with    GI Problem    Diarrhea     Stared last Friday   Turned dark brown on Tuesday and mucus like   Pt states Today stared straight  water     Abdominal Pain    Bloated       HPI  William Carter is a 24 y.o. male with  T2DM, on PrEP for HIV, MDD, Anxiety, h/o psychogenic nonepileptic seizures  that presents for evaluation of diarrhea.     GASTROINTESTINAL:  He reports acute onset of watery diarrhea since last Friday, initially with bathroom visits every 20 minutes, requiring him to sleep on the bathroom floor Saturday night. Stool characteristics changed to dark brown and mucusy. Currently experiencing 5-8 bowel movements daily with lower abdominal pain, stomach gurgling, and bubbling particularly after eating. He reports burping with bile taste in throat. He had one episode of vomiting after eating grapes but denies ongoing nausea. Appetite remains normal despite abdominal discomfort.    CURRENT MEDICATIONS:  He is taking dicyclomine with notable improvement in symptoms, though only taking it once or twice daily instead of prescribed 4 times daily dosing. Symptoms worsen when medication is missed, particularly overnight. He initially tried Pepto-Bismol which provided temporary relief. He reports missing one week of Ozempic prior to symptom onset.    SLEEP:  He reports difficulty with sleep onset despite feeling energized at bedtime. Previous trials of trazodone caused morning grogginess and irritability. Melatonin and herbal teas have been ineffective for improving sleep onset.             PAST MEDICAL HISTORY:  Past Medical History:   Diagnosis Date    Concussion     may 2014    Depression     History of psychiatric hospitalization     Panic attacks        PAST SURGICAL HISTORY:  Past Surgical History:   Procedure Laterality Date    ESOPHAGOGASTRODUODENOSCOPY N/A 8/15/2018    Procedure: ESOPHAGOGASTRODUODENOSCOPY (EGD);  Surgeon: Fatmata Lam MD;  [facility]:  [facility]  Service: Endoscopy;  Laterality: N/A;       FAMILY HISTORY:  Family History   Problem Relation Name Age of Onset    Hypertension Father      Diabetes Father      Depression Maternal Grandmother          situational    Diabetes Paternal Grandmother      Diabetes Paternal Grandfather      Bipolar disorder Mother      Drug abuse Mother         ALLERGIES AND MEDICATIONS: updated and reviewed.  Review of patient's allergies indicates:  No Known Allergies  Current Medications[1]    ROS  Review of Systems   Constitutional:  Negative for activity change, appetite change, chills, fatigue and fever.   HENT:  Negative for congestion.    Respiratory:  Negative for cough and shortness of breath.    Cardiovascular:  Negative for chest pain and palpitations.   Gastrointestinal:  Positive for abdominal pain and diarrhea. Negative for abdominal distention, anal bleeding, blood in stool, constipation, nausea, rectal pain and vomiting.   Psychiatric/Behavioral:  Positive for sleep disturbance. Negative for dysphoric mood. The patient is not nervous/anxious.          OBJECTIVE     Physical Exam  Vitals:    04/04/25 1328   BP: 100/80   Pulse: 100    Body mass index is 32.68 kg/m².  Weight: 103.3 kg (227 lb 11.8 oz)   Height: 5' 10" (177.8 cm)     Physical Exam  Vitals reviewed.   Constitutional:       General: He is not in acute distress.     Appearance: Normal appearance.   HENT:      Head: Normocephalic and atraumatic.      Mouth/Throat:      Mouth: Mucous membranes are moist.      Pharynx: Oropharynx is clear.   Eyes:      Extraocular Movements: Extraocular movements intact.      Conjunctiva/sclera: Conjunctivae normal.      Pupils: Pupils are equal, round, and reactive to light.   Cardiovascular:      Rate and Rhythm: Normal rate and regular rhythm.      Pulses: Normal pulses.      Heart sounds: Normal heart sounds.   Pulmonary:      Effort: Pulmonary effort is normal.      Breath sounds: Normal breath sounds. "   Abdominal:      General: There is no distension.      Tenderness: There is abdominal tenderness (mild tenderness with deep palpation) in the suprapubic area. There is no guarding or rebound.   Musculoskeletal:         General: Normal range of motion.      Cervical back: Normal range of motion and neck supple.   Skin:     General: Skin is warm and dry.   Neurological:      General: No focal deficit present.      Mental Status: He is alert.           ASSESSMENT     24 y.o. male with     1. Diarrhea, unspecified type    2. Difficulty falling asleep at night until early morning hours    3. On pre-exposure prophylaxis for HIV        PLAN:     1. Diarrhea, unspecified type  - Patient with diarrhea for 7 days. He has some associated abdominal pain with diarrhea. He is on a GLP-1 medication, will need to rule out pancreatitis and signs of biliary obstruction. He has had some worsening reflux, will check for H pylori. He is MSM, will work up for other infectious etiologies. Suprapubic tenderness on exam today, will check urinalysis.   - Can treat empirically with Azithromycin. Can continue Bentyl PRN, will also send Cholestryramine PRN.   - HEMOGLOBIN A1C; Future  - Comprehensive Metabolic Panel; Future  - LIPASE; Future  - H. pylori antigen, stool; Future  - Pancreatic elastase, fecal; Future  - Fecal fat, qualitative; Future  - Occult blood x 1, stool; Future  - WBC, Stool; Future  - Calprotectin, Stool; Future  - Giardia / Cryptosporidum, EIA; Future  - Stool Exam-Ova,Cysts,Parasites; Future  - Clostridium difficile EIA; Future  - Stool culture; Future  - cholestyramine (QUESTRAN) 4 gram packet; Take 1 packet (4 g total) by mouth 3 (three) times daily as needed (diarrhea).  Dispense: 30 packet; Refill: 0  - azithromycin (ZITHROMAX) 500 MG tablet; Take 1 tablet (500 mg total) by mouth once daily.  Dispense: 3 tablet; Refill: 0  - Urinalysis, Reflex to Urine Culture; Future    2. Difficulty falling asleep at night until  early morning hours  -  reviewed. Trial of Ambien. Counseled patient on safe and effective use of new medication, including common adverse effects. Patient verbalized understanding.   - Counseled on risks of prolonged use of sedative narcotics.   - zolpidem (AMBIEN) 5 MG Tab; Take 1 tablet (5 mg total) by mouth nightly as needed (insomnia).  Dispense: 30 tablet; Refill: 0    3. On pre-exposure prophylaxis for HIV  - On Truvada. Due for labs.   - HIV 1/2 Ag/Ab (4th Gen); Future  - Treponema Pallidium Antibodies IgG, IgM; Future  - HEPATITIS PANEL, ACUTE; Future      RTC PRN       Jordi Piper MD  Family Medicine [facility]  04/04/2025    This document was created using voice recognition software ([employer]). Although it may be edited, this document may contain errors related to incorrect recognition of the spoken word. Please call the physician if clarification is needed.       No follow-ups on file.                       [1]   Current Outpatient Medications   Medication Sig Dispense Refill    dicyclomine (BENTYL) 20 mg tablet Take 20 mg by mouth 4 (four) times daily as needed.      emtricitabine-tenofovir 200-300 mg (TRUVADA) 200-300 mg Tab Take 1 tablet by mouth once daily. 90 tablet 0    nicotine (NICODERM CQ) 21 mg/24 hr Place 1 patch onto the skin once daily. 28 patch 1    ondansetron (ZOFRAN-ODT) 4 MG TbDL Take 1 tablet (4 mg total) by mouth every 8 (eight) hours as needed. 14 tablet 1    rizatriptan (MAXALT-MLT) 10 MG disintegrating tablet Take 1 tablet by mouth once daily as needed for headache. May repeat dose in 2 hours if symptoms still present. 30 tablet 2    semaglutide (OZEMPIC) 0.25 mg or 0.5 mg (2 mg/3 mL) pen injector Inject 0.5 mg into the skin every 7 days. 3 mL 11    vilazodone (VIIBRYD) 20 mg Tab Take 1 tablet (20 mg total) by mouth Daily. 30 tablet 11     No current facility-administered medications for this visit.

## 2025-04-05 ENCOUNTER — LAB VISIT (OUTPATIENT)
Dept: LAB | Facility: HOSPITAL | Age: 25
End: 2025-04-05
Payer: COMMERCIAL

## 2025-04-05 DIAGNOSIS — R19.7 DIARRHEA, UNSPECIFIED TYPE: ICD-10-CM

## 2025-04-05 LAB — WBC #/AREA STL HPF: NORMAL /[HPF]

## 2025-04-05 PROCEDURE — 87427 SHIGA-LIKE TOXIN AG IA: CPT

## 2025-04-05 PROCEDURE — 82653 EL-1 FECAL QUANTITATIVE: CPT

## 2025-04-05 PROCEDURE — 82272 OCCULT BLD FECES 1-3 TESTS: CPT

## 2025-04-05 PROCEDURE — 89055 LEUKOCYTE ASSESSMENT FECAL: CPT

## 2025-04-05 PROCEDURE — 82705 FATS/LIPIDS FECES QUAL: CPT

## 2025-04-05 PROCEDURE — 87209 SMEAR COMPLEX STAIN: CPT

## 2025-04-05 PROCEDURE — 87328 CRYPTOSPORIDIUM AG IA: CPT

## 2025-04-05 PROCEDURE — 87046 STOOL CULTR AEROBIC BACT EA: CPT | Mod: 91

## 2025-04-05 PROCEDURE — 87324 CLOSTRIDIUM AG IA: CPT

## 2025-04-06 LAB
C DIFF GDH STL QL: NEGATIVE
C DIFF TOX A+B STL QL IA: NEGATIVE
OB PNL STL: NEGATIVE

## 2025-04-07 LAB
CALPROTECTIN INTERPRETATION (OHS): NORMAL
CALPROTECTIN, STOOL (OHS): 91.5
CRYPTOSP AG SPEC QL: NEGATIVE
E COLI SXT1 STL QL IA: NEGATIVE
E COLI SXT2 STL QL IA: NEGATIVE
ELASTASE, STOOL INTERPRETATION (OHS): NORMAL
G LAMBLIA AG STL QL IA: NEGATIVE
H. PYLORI SURFACE ANTIGEN, INTERPRETATION (OHS): NEGATIVE
HELICOBACTER PYLORI SURFACE ANTIGEN (OHS): 0.12
PANCREATIC ELASTASE, FECAL (OHS): 147 ΜG/G

## 2025-04-08 LAB
BACTERIA STL CULT: NORMAL
FAT STL QL: NORMAL
NEUTRAL FAT STL QL: NORMAL
O+P STL MICRO: NORMAL

## 2025-05-01 ENCOUNTER — PATIENT MESSAGE (OUTPATIENT)
Dept: ADMINISTRATIVE | Facility: OTHER | Age: 25
End: 2025-05-01
Payer: COMMERCIAL

## 2025-05-26 ENCOUNTER — RESULTS FOLLOW-UP (OUTPATIENT)
Dept: INTERNAL MEDICINE | Facility: CLINIC | Age: 25
End: 2025-05-26
Payer: COMMERCIAL

## 2025-05-26 DIAGNOSIS — R31.21 ASYMPTOMATIC MICROSCOPIC HEMATURIA: Primary | ICD-10-CM

## 2025-05-28 DIAGNOSIS — Z72.52 HIGH RISK HOMOSEXUAL BEHAVIOR: ICD-10-CM

## 2025-05-29 RX ORDER — EMTRICITABINE AND TENOFOVIR DISOPROXIL FUMARATE 200; 300 MG/1; MG/1
1 TABLET, FILM COATED ORAL DAILY
Qty: 90 TABLET | Refills: 0 | Status: ACTIVE | OUTPATIENT
Start: 2025-05-29

## 2025-06-06 ENCOUNTER — PATIENT MESSAGE (OUTPATIENT)
Dept: UROLOGY | Facility: CLINIC | Age: 25
End: 2025-06-06
Payer: COMMERCIAL

## 2025-09-02 ENCOUNTER — E-VISIT (OUTPATIENT)
Dept: INTERNAL MEDICINE | Facility: CLINIC | Age: 25
End: 2025-09-02
Payer: COMMERCIAL

## 2025-09-02 DIAGNOSIS — Z20.6 HISTORY OF EXPOSURE TO HIV: Primary | ICD-10-CM

## 2025-09-02 DIAGNOSIS — Z72.52 HIGH RISK HOMOSEXUAL BEHAVIOR: ICD-10-CM

## 2025-09-02 DIAGNOSIS — E11.65 TYPE 2 DIABETES MELLITUS WITH HYPERGLYCEMIA, WITH LONG-TERM CURRENT USE OF INSULIN: ICD-10-CM

## 2025-09-02 DIAGNOSIS — Z79.4 TYPE 2 DIABETES MELLITUS WITH HYPERGLYCEMIA, WITH LONG-TERM CURRENT USE OF INSULIN: ICD-10-CM

## 2025-09-03 RX ORDER — EMTRICITABINE AND TENOFOVIR DISOPROXIL FUMARATE 200; 300 MG/1; MG/1
1 TABLET, FILM COATED ORAL DAILY
Qty: 90 TABLET | Refills: 0 | Status: ACTIVE | OUTPATIENT
Start: 2025-09-03